# Patient Record
Sex: MALE | Race: WHITE | NOT HISPANIC OR LATINO | Employment: FULL TIME | URBAN - METROPOLITAN AREA
[De-identification: names, ages, dates, MRNs, and addresses within clinical notes are randomized per-mention and may not be internally consistent; named-entity substitution may affect disease eponyms.]

---

## 2018-10-01 ENCOUNTER — PATIENT MESSAGE (OUTPATIENT)
Dept: INTERNAL MEDICINE | Facility: CLINIC | Age: 33
End: 2018-10-01

## 2018-10-01 ENCOUNTER — OFFICE VISIT (OUTPATIENT)
Dept: INTERNAL MEDICINE | Facility: CLINIC | Age: 33
End: 2018-10-01
Attending: FAMILY MEDICINE
Payer: COMMERCIAL

## 2018-10-01 ENCOUNTER — LAB VISIT (OUTPATIENT)
Dept: LAB | Facility: OTHER | Age: 33
End: 2018-10-01
Attending: FAMILY MEDICINE
Payer: COMMERCIAL

## 2018-10-01 VITALS
BODY MASS INDEX: 25.39 KG/M2 | SYSTOLIC BLOOD PRESSURE: 122 MMHG | HEART RATE: 64 BPM | HEIGHT: 63 IN | DIASTOLIC BLOOD PRESSURE: 72 MMHG | WEIGHT: 143.31 LBS | OXYGEN SATURATION: 98 %

## 2018-10-01 DIAGNOSIS — F41.9 ANXIETY: ICD-10-CM

## 2018-10-01 DIAGNOSIS — R79.89 ELEVATED LFTS: Primary | ICD-10-CM

## 2018-10-01 DIAGNOSIS — J30.2 SEASONAL ALLERGIES: ICD-10-CM

## 2018-10-01 DIAGNOSIS — Z00.00 ANNUAL PHYSICAL EXAM: ICD-10-CM

## 2018-10-01 DIAGNOSIS — Z00.00 ANNUAL PHYSICAL EXAM: Primary | ICD-10-CM

## 2018-10-01 LAB
25(OH)D3+25(OH)D2 SERPL-MCNC: 33 NG/ML
ALBUMIN SERPL BCP-MCNC: 4.6 G/DL
ALP SERPL-CCNC: 62 U/L
ALT SERPL W/O P-5'-P-CCNC: 34 U/L
ANION GAP SERPL CALC-SCNC: 10 MMOL/L
AST SERPL-CCNC: 42 U/L
BASOPHILS # BLD AUTO: 0.02 K/UL
BASOPHILS NFR BLD: 0.4 %
BILIRUB SERPL-MCNC: 0.7 MG/DL
BUN SERPL-MCNC: 15 MG/DL
CALCIUM SERPL-MCNC: 9.5 MG/DL
CHLORIDE SERPL-SCNC: 105 MMOL/L
CHOLEST SERPL-MCNC: 155 MG/DL
CHOLEST/HDLC SERPL: 2.8 {RATIO}
CO2 SERPL-SCNC: 26 MMOL/L
CREAT SERPL-MCNC: 0.9 MG/DL
DIFFERENTIAL METHOD: ABNORMAL
EOSINOPHIL # BLD AUTO: 0.1 K/UL
EOSINOPHIL NFR BLD: 2.5 %
ERYTHROCYTE [DISTWIDTH] IN BLOOD BY AUTOMATED COUNT: 12.3 %
EST. GFR  (AFRICAN AMERICAN): >60 ML/MIN/1.73 M^2
EST. GFR  (NON AFRICAN AMERICAN): >60 ML/MIN/1.73 M^2
GLUCOSE SERPL-MCNC: 83 MG/DL
HCT VFR BLD AUTO: 42.3 %
HDLC SERPL-MCNC: 56 MG/DL
HDLC SERPL: 36.1 %
HGB BLD-MCNC: 14.8 G/DL
LDLC SERPL CALC-MCNC: 81.4 MG/DL
LYMPHOCYTES # BLD AUTO: 2 K/UL
LYMPHOCYTES NFR BLD: 40.3 %
MCH RBC QN AUTO: 30.3 PG
MCHC RBC AUTO-ENTMCNC: 35 G/DL
MCV RBC AUTO: 87 FL
MONOCYTES # BLD AUTO: 0.4 K/UL
MONOCYTES NFR BLD: 8 %
NEUTROPHILS # BLD AUTO: 2.4 K/UL
NEUTROPHILS NFR BLD: 48.6 %
NONHDLC SERPL-MCNC: 99 MG/DL
PLATELET # BLD AUTO: 199 K/UL
PMV BLD AUTO: 9.1 FL
POTASSIUM SERPL-SCNC: 3.9 MMOL/L
PROT SERPL-MCNC: 7.4 G/DL
RBC # BLD AUTO: 4.88 M/UL
SODIUM SERPL-SCNC: 141 MMOL/L
T4 FREE SERPL-MCNC: 0.9 NG/DL
TRIGL SERPL-MCNC: 88 MG/DL
TSH SERPL DL<=0.005 MIU/L-ACNC: 1.33 UIU/ML
VIT B12 SERPL-MCNC: 306 PG/ML
WBC # BLD AUTO: 4.89 K/UL

## 2018-10-01 PROCEDURE — 99999 PR PBB SHADOW E&M-NEW PATIENT-LVL III: CPT | Mod: PBBFAC,,, | Performed by: FAMILY MEDICINE

## 2018-10-01 PROCEDURE — 80061 LIPID PANEL: CPT

## 2018-10-01 PROCEDURE — 84439 ASSAY OF FREE THYROXINE: CPT

## 2018-10-01 PROCEDURE — 36415 COLL VENOUS BLD VENIPUNCTURE: CPT

## 2018-10-01 PROCEDURE — 84443 ASSAY THYROID STIM HORMONE: CPT

## 2018-10-01 PROCEDURE — 80053 COMPREHEN METABOLIC PANEL: CPT

## 2018-10-01 PROCEDURE — 82306 VITAMIN D 25 HYDROXY: CPT

## 2018-10-01 PROCEDURE — 99385 PREV VISIT NEW AGE 18-39: CPT | Mod: S$GLB,,, | Performed by: FAMILY MEDICINE

## 2018-10-01 PROCEDURE — 82607 VITAMIN B-12: CPT

## 2018-10-01 PROCEDURE — 85025 COMPLETE CBC W/AUTO DIFF WBC: CPT

## 2018-10-01 RX ORDER — LANOLIN ALCOHOL/MO/W.PET/CERES
400 CREAM (GRAM) TOPICAL NIGHTLY
Qty: 30 TABLET | Refills: 3 | Status: SHIPPED | OUTPATIENT
Start: 2018-10-01

## 2018-10-01 RX ORDER — PROPRANOLOL HYDROCHLORIDE 20 MG/1
20 TABLET ORAL DAILY PRN
Qty: 30 TABLET | Refills: 1 | Status: SHIPPED | OUTPATIENT
Start: 2018-10-01 | End: 2019-10-01

## 2018-10-01 NOTE — PATIENT INSTRUCTIONS
Call to make an appointment within [facility] for psychiatry/psychology [phone]    Linda Young(psychiatrist) [address] [facility] [address] [facility] [address] Phone: [phone]  Aguilar Campos (psychiatrist) [phone]   [address] [facility]     [facility] [address], [address]  Phone: [phone]  You can email for an appointment at: [email or web address]      Marion Doeger    LCSLESIA (therapist) [phone]   [address] [facility]   Shannon Neumann   LCSW (therapist) [phone]   [address] [facility]   Stephanie Mary LCSLESIA (therapist) [phone]   [address] [facility]     Chidi Brown [phone] (therapist) [address] [facility]    Jonathan Neumann (therapist) [phone]  [address] [facility]    Mark Baker (therapist) [phone] [address] [facility]     [facility] [phone]  [facility] [place] [address]    [facility] [place] [address] [place] [address]  [phone]  [email or web address]      AS needed   Propranolol   Hydroxyzine  Xanax or klonipin     Daily medications   wellbutrin   prozac   zoloft       Propranolol tablets  What is this medicine?  PROPRANOLOL (proe PRAN oh lole) is a beta-blocker. Beta-blockers reduce the workload on the heart and help it to beat more regularly. This medicine is used to treat high blood pressure, to control irregular heart rhythms (arrhythmias) and to relieve chest pain caused by angina. It may also be helpful after a heart attack. This medicine is also used to prevent migraine headaches, relieve uncontrollable shaking (tremors), and help certain problems related to the thyroid gland and adrenal gland.  How should I use this medicine?  Take this medicine by mouth with a glass of water. Follow the directions on the prescription label. Take your doses at regular intervals. Do not take your medicine more often than directed. Do not stop taking except on your the advice of your doctor or health care  professional.  Talk to your pediatrician regarding the use of this medicine in children. Special care may be needed.  What side effects may I notice from receiving this medicine?  Side effects that you should report to your doctor or health care professional as soon as possible:  · allergic reactions like skin rash, itching or hives, swelling of the face, lips, or tongue  · breathing problems  · changes in blood sugar  · cold hands or feet  · difficulty sleeping, nightmares  · dry peeling skin  · hallucinations  · muscle cramps or weakness  · slow heart rate  · swelling of the legs and ankles  · vomiting  Side effects that usually do not require medical attention (report to your doctor or health care professional if they continue or are bothersome):  · change in sex drive or performance  · diarrhea  · dry sore eyes  · hair loss  · nausea  · weak or tired  What may interact with this medicine?  Do not take this medicine with any of the following medications:  · feverfew  · phenothiazines like chlorpromazine, mesoridazine, prochlorperazine, thioridazine  This medicine may also interact with the following medications:  · aluminum hydroxide gel  · antipyrine  · antiviral medicines for HIV or AIDS  · barbiturates like phenobarbital  · certain medicines for blood pressure, heart disease, irregular heart beat  · cimetidine  · ciprofloxacin  · diazepam  · fluconazole  · haloperidol  · isoniazid  · medicines for cholesterol like cholestyramine or colestipol  · medicines for mental depression  · medicines for migraine headache like almotriptan, eletriptan, frovatriptan, naratriptan, rizatriptan, sumatriptan, zolmitriptan  · NSAIDs, medicines for pain and inflammation, like ibuprofen or naproxen  · phenytoin  · rifampin  · teniposide  · theophylline  · thyroid medicines  · tolbutamide  · warfarin  · zileuton  What if I miss a dose?  If you miss a dose, take it as soon as you can. If it is almost time for your next dose, take  only that dose. Do not take double or extra doses.  Where should I keep my medicine?  Keep out of the reach of children.  Store at room temperature between 15 and 30 degrees C (59 and 86 degrees F). Protect from light. Throw away any unused medicine after the expiration date.  What should I tell my health care provider before I take this medicine?  They need to know if you have any of these conditions:  · circulation problems or blood vessel disease  · diabetes  · history of heart attack or heart disease, vasospastic angina  · kidney disease  · liver disease  · lung or breathing disease, like asthma or emphysema  · pheochromocytoma  · slow heart rate  · thyroid disease  · an unusual or allergic reaction to propranolol, other beta-blockers, medicines, foods, dyes, or preservatives  · pregnant or trying to get pregnant  · breast-feeding  What should I watch for while using this medicine?  Visit your doctor or health care professional for regular check ups. Check your blood pressure and pulse rate regularly. Ask your health care professional what your blood pressure and pulse rate should be, and when you should contact them.  You may get drowsy or dizzy. Do not drive, use machinery, or do anything that needs mental alertness until you know how this drug affects you. Do not stand or sit up quickly, especially if you are an older patient. This reduces the risk of dizzy or fainting spells. Alcohol can make you more drowsy and dizzy. Avoid alcoholic drinks.  This medicine can affect blood sugar levels. If you have diabetes, check with your doctor or health care professional before you change your diet or the dose of your diabetic medicine.  Do not treat yourself for coughs, colds, or pain while you are taking this medicine without asking your doctor or health care professional for advice. Some ingredients may increase your blood pressure.  NOTE:This sheet is a summary. It may not cover all possible information. If you have  questions about this medicine, talk to your doctor, pharmacist, or health care provider. Copyright© 2017 [employer]

## 2018-10-01 NOTE — PROGRESS NOTES
"Subjective:      Patient ID: Ferny Torres is a 33 y.o. male.    Chief Complaint: Establish Care    HPI   He is here for annual exam. He does go to [facility] dermatology. He reports his mood is good, interest in hobbies there, no guilt, energy level is ok, he has increase in anxiety prior to meetings and that can occur randomly, no SI or HI.      Review of Systems   Constitutional: Negative for activity change, appetite change, chills, diaphoresis, fatigue, fever and unexpected weight change.   HENT: Negative for congestion, postnasal drip, rhinorrhea, sinus pressure, sore throat and trouble swallowing.    Respiratory: Negative for cough, choking, chest tightness, shortness of breath and wheezing.    Cardiovascular: Negative for chest pain, palpitations and leg swelling.   Gastrointestinal: Negative for abdominal distention, abdominal pain, blood in stool, constipation, diarrhea, nausea and vomiting.   Genitourinary: Negative for difficulty urinating, discharge, frequency and hematuria.   Musculoskeletal: Negative.    Psychiatric/Behavioral: Negative for suicidal ideas.     I personally reviewed Past Medical History, Past Surgical history,  Past Social History and Family History      Objective:   /72 (BP Location: Left arm, Patient Position: Sitting, BP Method: Small (Manual))   Pulse 64   Ht 5' 3" (1.6 m)   Wt 65 kg (143 lb 4.8 oz)   SpO2 98%   BMI 25.38 kg/m²     Physical Exam   Constitutional: He is oriented to person, place, and time. He appears well-developed and well-nourished. No distress.   HENT:   Head: Normocephalic and atraumatic.   Right Ear: Hearing, tympanic membrane, external ear and ear canal normal.   Left Ear: Hearing, tympanic membrane, external ear and ear canal normal.   Nose: Nose normal.   Mouth/Throat: Uvula is midline and oropharynx is clear and moist. No oropharyngeal exudate.   Eyes: Conjunctivae and EOM are normal. Pupils are equal, round, and reactive to light.   Neck: Normal range " of motion. Neck supple.   Cardiovascular: Normal rate, regular rhythm, normal heart sounds and intact distal pulses. Exam reveals no gallop and no friction rub.   No murmur heard.  Pulmonary/Chest: Effort normal and breath sounds normal. No stridor. No respiratory distress. He has no wheezes. He has no rales. He exhibits no tenderness.   Abdominal: Soft. Bowel sounds are normal. He exhibits no distension and no mass. There is no tenderness. There is no rebound and no guarding.   Musculoskeletal: Normal range of motion.   Neurological: He is alert and oriented to person, place, and time. No cranial nerve deficit.   Skin: Skin is warm and dry. He is not diaphoretic.   Vitals reviewed.      1. Annual physical exam    2. Seasonal allergies    3. Anxiety        1. Check labs  2. stable, cont current regimen   3. Trial propranolol, if no improvement consider xanax and if no improvement wellbutrin, prozac or zoloft       Orders Placed This Encounter   Procedures    CBC auto differential    Comprehensive metabolic panel    Lipid panel    TSH    T4, free    Vitamin D    Vitamin B12     Medications Ordered This Encounter   Medications    magnesium oxide (MAG-OX) 400 mg tablet     Sig: Take 1 tablet (400 mg total) by mouth every evening.     Dispense:  30 tablet     Refill:  3    propranolol (INDERAL) 20 MG tablet     Sig: Take 1 tablet (20 mg total) by mouth daily as needed.     Dispense:  30 tablet     Refill:  1

## 2019-10-01 ENCOUNTER — PATIENT MESSAGE (OUTPATIENT)
Dept: INTERNAL MEDICINE | Facility: CLINIC | Age: 34
End: 2019-10-01

## 2019-10-01 ENCOUNTER — OFFICE VISIT (OUTPATIENT)
Dept: PRIMARY CARE CLINIC | Facility: CLINIC | Age: 34
End: 2019-10-01
Attending: FAMILY MEDICINE
Payer: COMMERCIAL

## 2019-10-01 ENCOUNTER — TELEPHONE (OUTPATIENT)
Dept: INTERNAL MEDICINE | Facility: CLINIC | Age: 34
End: 2019-10-01

## 2019-10-01 VITALS
HEART RATE: 53 BPM | HEIGHT: 63 IN | SYSTOLIC BLOOD PRESSURE: 116 MMHG | DIASTOLIC BLOOD PRESSURE: 72 MMHG | BODY MASS INDEX: 25.04 KG/M2 | OXYGEN SATURATION: 100 % | WEIGHT: 141.31 LBS

## 2019-10-01 DIAGNOSIS — F41.8 PERFORMANCE ANXIETY: ICD-10-CM

## 2019-10-01 DIAGNOSIS — Z00.00 ANNUAL PHYSICAL EXAM: Primary | ICD-10-CM

## 2019-10-01 DIAGNOSIS — J30.2 SEASONAL ALLERGIES: ICD-10-CM

## 2019-10-01 DIAGNOSIS — Z91.018 MULTIPLE FOOD ALLERGIES: ICD-10-CM

## 2019-10-01 PROCEDURE — 99999 PR PBB SHADOW E&M-EST. PATIENT-LVL III: ICD-10-PCS | Mod: PBBFAC,,, | Performed by: FAMILY MEDICINE

## 2019-10-01 PROCEDURE — 99395 PREV VISIT EST AGE 18-39: CPT | Mod: 25,S$GLB,, | Performed by: FAMILY MEDICINE

## 2019-10-01 PROCEDURE — 90686 FLU VACCINE (QUAD) GREATER THAN OR EQUAL TO 3YO PRESERVATIVE FREE IM: ICD-10-PCS | Mod: S$GLB,,, | Performed by: FAMILY MEDICINE

## 2019-10-01 PROCEDURE — 99395 PR PREVENTIVE VISIT,EST,18-39: ICD-10-PCS | Mod: 25,S$GLB,, | Performed by: FAMILY MEDICINE

## 2019-10-01 PROCEDURE — 90471 IMMUNIZATION ADMIN: CPT | Mod: S$GLB,,, | Performed by: FAMILY MEDICINE

## 2019-10-01 PROCEDURE — 90471 FLU VACCINE (QUAD) GREATER THAN OR EQUAL TO 3YO PRESERVATIVE FREE IM: ICD-10-PCS | Mod: S$GLB,,, | Performed by: FAMILY MEDICINE

## 2019-10-01 PROCEDURE — 90686 IIV4 VACC NO PRSV 0.5 ML IM: CPT | Mod: S$GLB,,, | Performed by: FAMILY MEDICINE

## 2019-10-01 PROCEDURE — 99999 PR PBB SHADOW E&M-EST. PATIENT-LVL III: CPT | Mod: PBBFAC,,, | Performed by: FAMILY MEDICINE

## 2019-10-01 RX ORDER — CLONAZEPAM 0.5 MG/1
0.5 TABLET ORAL DAILY PRN
Qty: 30 TABLET | Refills: 0 | Status: SHIPPED | OUTPATIENT
Start: 2019-10-01 | End: 2020-09-30

## 2019-10-01 NOTE — PROGRESS NOTES
"Subjective:      Patient ID: Ferny Torres is a 34 y.o. male.    Chief Complaint: Annual Exam    HPI   Patient here today for annual exam. Propranolol did not help with presentations.     Review of Systems   Constitutional: Negative for activity change, appetite change, chills, diaphoresis, fatigue, fever and unexpected weight change.   HENT: Negative for congestion, postnasal drip, rhinorrhea, sinus pressure, sore throat and trouble swallowing.    Respiratory: Negative for cough, choking, chest tightness, shortness of breath and wheezing.    Cardiovascular: Negative for chest pain, palpitations and leg swelling.   Gastrointestinal: Negative for abdominal distention, abdominal pain, blood in stool, constipation, diarrhea, nausea and vomiting.   Genitourinary: Negative for difficulty urinating, discharge, frequency and hematuria.   Musculoskeletal: Negative.    Psychiatric/Behavioral: Negative for suicidal ideas.     I personally reviewed Past Medical History, Past Surgical history,  Past Social History and Family History      Objective:   /72 (BP Location: Left arm, Patient Position: Sitting)   Pulse (!) 53   Ht 5' 3" (1.6 m)   Wt 64.1 kg (141 lb 5 oz)   SpO2 100%   BMI 25.03 kg/m²     Physical Exam   Constitutional: He is oriented to person, place, and time. He appears well-developed and well-nourished. No distress.   HENT:   Head: Normocephalic and atraumatic.   Right Ear: Hearing, tympanic membrane, external ear and ear canal normal.   Left Ear: Hearing, tympanic membrane, external ear and ear canal normal.   Nose: Nose normal.   Mouth/Throat: Uvula is midline and oropharynx is clear and moist. No oropharyngeal exudate.   Eyes: Pupils are equal, round, and reactive to light. Conjunctivae and EOM are normal.   Neck: Normal range of motion. Neck supple.   Cardiovascular: Normal rate, regular rhythm, normal heart sounds and intact distal pulses. Exam reveals no gallop and no friction rub.   No murmur " heard.  Pulmonary/Chest: Effort normal and breath sounds normal. No stridor. No respiratory distress. He has no wheezes. He has no rales. He exhibits no tenderness.   Abdominal: Soft. Bowel sounds are normal. He exhibits no distension and no mass. There is no tenderness. There is no rebound and no guarding.   Musculoskeletal: Normal range of motion.   Neurological: He is alert and oriented to person, place, and time. No cranial nerve deficit.   Skin: Skin is warm and dry. He is not diaphoretic.   Vitals reviewed.      1. Annual physical exam    2. Multiple food allergies    3. Seasonal allergies    4. Performance anxiety        1. Dr. Pedro for skin checks, labs   2/3. Allergy evaluation   4. Trial of klonipin if too sedating try xanax he will call to notify    Orders Placed This Encounter   Procedures    Influenza - Quadrivalent (PF)    Comprehensive metabolic panel    CBC auto differential    Lipid panel    Ambulatory consult to Allergy     Medications Ordered This Encounter   Medications    clonazePAM (KLONOPIN) 0.5 MG tablet     Sig: Take 1 tablet (0.5 mg total) by mouth daily as needed for Anxiety.     Dispense:  30 tablet     Refill:  0

## 2019-10-01 NOTE — PROGRESS NOTES
"Patient was given vaccine information sheet for the Flu Vaccine. The area of injection was palpated using the acromion process as a landmark. This area was cleaned with alcohol. Using a 25g 1" safety needle, 0.5mL of the vaccine was placed into the left muscle. The injection site was dressed with a bandage. Patient experienced no complications and was discharged in stable condition. Fluarix vaccine Lot: [id number] Exp: 06/30/2020.  Eveline Gonsalez LPN      "

## 2019-10-22 ENCOUNTER — OFFICE VISIT (OUTPATIENT)
Dept: ALLERGY | Facility: CLINIC | Age: 34
End: 2019-10-22
Payer: COMMERCIAL

## 2019-10-22 ENCOUNTER — LAB VISIT (OUTPATIENT)
Dept: LAB | Facility: HOSPITAL | Age: 34
End: 2019-10-22
Attending: FAMILY MEDICINE
Payer: COMMERCIAL

## 2019-10-22 VITALS — HEIGHT: 63 IN | WEIGHT: 142 LBS | BODY MASS INDEX: 25.16 KG/M2

## 2019-10-22 DIAGNOSIS — R19.7 DIARRHEA, UNSPECIFIED TYPE: ICD-10-CM

## 2019-10-22 DIAGNOSIS — J31.0 CHRONIC RHINITIS: Primary | ICD-10-CM

## 2019-10-22 DIAGNOSIS — Z00.00 ANNUAL PHYSICAL EXAM: ICD-10-CM

## 2019-10-22 LAB
ALBUMIN SERPL BCP-MCNC: 4.5 G/DL (ref 3.5–5.2)
ALP SERPL-CCNC: 58 U/L (ref 55–135)
ALT SERPL W/O P-5'-P-CCNC: 15 U/L (ref 10–44)
ANION GAP SERPL CALC-SCNC: 10 MMOL/L (ref 8–16)
AST SERPL-CCNC: 21 U/L (ref 10–40)
BASOPHILS # BLD AUTO: 0.03 K/UL (ref 0–0.2)
BASOPHILS NFR BLD: 0.7 % (ref 0–1.9)
BILIRUB SERPL-MCNC: 0.7 MG/DL (ref 0.1–1)
BUN SERPL-MCNC: 14 MG/DL (ref 6–20)
CALCIUM SERPL-MCNC: 9.6 MG/DL (ref 8.7–10.5)
CHLORIDE SERPL-SCNC: 104 MMOL/L (ref 95–110)
CHOLEST SERPL-MCNC: 131 MG/DL (ref 120–199)
CHOLEST/HDLC SERPL: 2.3 {RATIO} (ref 2–5)
CO2 SERPL-SCNC: 24 MMOL/L (ref 23–29)
CREAT SERPL-MCNC: 1.1 MG/DL (ref 0.5–1.4)
DIFFERENTIAL METHOD: NORMAL
EOSINOPHIL # BLD AUTO: 0.1 K/UL (ref 0–0.5)
EOSINOPHIL NFR BLD: 1.5 % (ref 0–8)
ERYTHROCYTE [DISTWIDTH] IN BLOOD BY AUTOMATED COUNT: 12.2 % (ref 11.5–14.5)
EST. GFR  (AFRICAN AMERICAN): >60 ML/MIN/1.73 M^2
EST. GFR  (NON AFRICAN AMERICAN): >60 ML/MIN/1.73 M^2
GLUCOSE SERPL-MCNC: 82 MG/DL (ref 70–110)
HCT VFR BLD AUTO: 40.9 % (ref 40–54)
HDLC SERPL-MCNC: 56 MG/DL (ref 40–75)
HDLC SERPL: 42.7 % (ref 20–50)
HGB BLD-MCNC: 14.4 G/DL (ref 14–18)
IMM GRANULOCYTES # BLD AUTO: 0 K/UL (ref 0–0.04)
IMM GRANULOCYTES NFR BLD AUTO: 0 % (ref 0–0.5)
LDLC SERPL CALC-MCNC: 66.8 MG/DL (ref 63–159)
LYMPHOCYTES # BLD AUTO: 1.7 K/UL (ref 1–4.8)
LYMPHOCYTES NFR BLD: 37.1 % (ref 18–48)
MCH RBC QN AUTO: 30.3 PG (ref 27–31)
MCHC RBC AUTO-ENTMCNC: 35.2 G/DL (ref 32–36)
MCV RBC AUTO: 86 FL (ref 82–98)
MONOCYTES # BLD AUTO: 0.4 K/UL (ref 0.3–1)
MONOCYTES NFR BLD: 8.3 % (ref 4–15)
NEUTROPHILS # BLD AUTO: 2.4 K/UL (ref 1.8–7.7)
NEUTROPHILS NFR BLD: 52.4 % (ref 38–73)
NONHDLC SERPL-MCNC: 75 MG/DL
NRBC BLD-RTO: 0 /100 WBC
PLATELET # BLD AUTO: 215 K/UL (ref 150–350)
PMV BLD AUTO: 9.8 FL (ref 9.2–12.9)
POTASSIUM SERPL-SCNC: 3.7 MMOL/L (ref 3.5–5.1)
PROT SERPL-MCNC: 7.2 G/DL (ref 6–8.4)
RBC # BLD AUTO: 4.76 M/UL (ref 4.6–6.2)
SODIUM SERPL-SCNC: 138 MMOL/L (ref 136–145)
TRIGL SERPL-MCNC: 41 MG/DL (ref 30–150)
WBC # BLD AUTO: 4.58 K/UL (ref 3.9–12.7)

## 2019-10-22 PROCEDURE — 36415 COLL VENOUS BLD VENIPUNCTURE: CPT | Mod: PO

## 2019-10-22 PROCEDURE — 80053 COMPREHEN METABOLIC PANEL: CPT

## 2019-10-22 PROCEDURE — 99999 PR PBB SHADOW E&M-EST. PATIENT-LVL III: ICD-10-PCS | Mod: PBBFAC,,, | Performed by: ALLERGY & IMMUNOLOGY

## 2019-10-22 PROCEDURE — 99244 OFF/OP CNSLTJ NEW/EST MOD 40: CPT | Mod: 25,S$GLB,, | Performed by: ALLERGY & IMMUNOLOGY

## 2019-10-22 PROCEDURE — 99244 PR OFFICE CONSULTATION,LEVEL IV: ICD-10-PCS | Mod: 25,S$GLB,, | Performed by: ALLERGY & IMMUNOLOGY

## 2019-10-22 PROCEDURE — 99999 PR PBB SHADOW E&M-EST. PATIENT-LVL III: CPT | Mod: PBBFAC,,, | Performed by: ALLERGY & IMMUNOLOGY

## 2019-10-22 PROCEDURE — 80061 LIPID PANEL: CPT

## 2019-10-22 PROCEDURE — 85025 COMPLETE CBC W/AUTO DIFF WBC: CPT

## 2019-10-22 PROCEDURE — 95004 PERQ TESTS W/ALRGNC XTRCS: CPT | Mod: S$GLB,,, | Performed by: ALLERGY & IMMUNOLOGY

## 2019-10-22 PROCEDURE — 95004 PR ALLERGY SKIN TESTS,ALLERGENS: ICD-10-PCS | Mod: S$GLB,,, | Performed by: ALLERGY & IMMUNOLOGY

## 2019-10-22 RX ORDER — AZELASTINE 1 MG/ML
2 SPRAY, METERED NASAL 2 TIMES DAILY PRN
Qty: 30 ML | Refills: 12 | Status: SHIPPED | OUTPATIENT
Start: 2019-10-22

## 2019-10-22 NOTE — PROGRESS NOTES
Subjective:       Patient ID: Ferny Torres is a 34 y.o. male.    Chief Complaint:  Allergy Testing      33 yo man presents for consult from Dr Lia Aranda for possible allergies. He states for few years he has dry eyes or itchy watery eyes, puffy face, sneeze, runny nose and occ SOB. Has this off and on during the year, no season worse. Worse in AM. No difference inside or out. No triggers. Has taken Claritin, zyrtec or allegra prn and helped a little. No asthma or eczema. He did have deviated septum and had repair this year and seems to have helped. He also worries about food allergy. noticed after sushi he gets diarrhea dn cramping about 20 minutes later. Happens most every time regardless of restaurant. Other times after eating feels fatigues. No hives or swelling. No other medical issues.       Environmental History: see history section for home environment  Review of Systems   Constitutional: Negative for activity change, appetite change, chills, fatigue, fever and unexpected weight change.   HENT: Positive for congestion, rhinorrhea and sneezing. Negative for ear discharge, ear pain, facial swelling, hearing loss, mouth sores, nosebleeds, postnasal drip, sinus pressure, sore throat, tinnitus, trouble swallowing and voice change.    Eyes: Positive for discharge and itching. Negative for redness and visual disturbance.   Respiratory: Negative for cough, chest tightness, shortness of breath and wheezing.    Cardiovascular: Negative for chest pain, palpitations and leg swelling.   Gastrointestinal: Positive for abdominal pain and diarrhea. Negative for abdominal distention, constipation, nausea and vomiting.   Genitourinary: Negative for difficulty urinating.   Musculoskeletal: Negative for arthralgias, back pain, joint swelling and myalgias.   Skin: Negative for color change, pallor and rash.   Neurological: Negative for dizziness, tremors, speech difficulty, weakness, light-headedness and headaches.    Hematological: Negative for adenopathy. Does not bruise/bleed easily.   Psychiatric/Behavioral: Negative for agitation, confusion, decreased concentration and sleep disturbance. The patient is not nervous/anxious.         Objective:      Physical Exam   Constitutional: He is oriented to person, place, and time. He appears well-developed and well-nourished. No distress.   HENT:   Head: Normocephalic and atraumatic.   Right Ear: Hearing, tympanic membrane, external ear and ear canal normal.   Left Ear: Hearing, tympanic membrane, external ear and ear canal normal.   Nose: No mucosal edema (pink turbinates), rhinorrhea, sinus tenderness or septal deviation. No epistaxis.   Mouth/Throat: Oropharynx is clear and moist and mucous membranes are normal. No uvula swelling.   Eyes: Conjunctivae are normal. Right eye exhibits no discharge. Left eye exhibits no discharge.   Neck: Normal range of motion. No thyromegaly present.   Cardiovascular: Normal rate, regular rhythm and normal heart sounds.   No murmur heard.  Pulmonary/Chest: Effort normal and breath sounds normal. No respiratory distress. He has no wheezes.   Abdominal: Soft. He exhibits no distension. There is no tenderness.   Musculoskeletal: Normal range of motion. He exhibits no edema.   Lymphadenopathy:     He has no cervical adenopathy.   Neurological: He is alert and oriented to person, place, and time. Coordination normal.   Skin: Skin is warm and dry. No rash noted. No erythema.   Psychiatric: He has a normal mood and affect. His behavior is normal. Judgment and thought content normal.   Nursing note and vitals reviewed.      Laboratory:   Percutaneous Skin Testing: prick skin test inhalants and select foods, #75, 10/22/19: 1+ [place] and [place] grasses, 3+ histamine control, remainder tested negative, see flow sheet  Assessment:       1. Chronic rhinitis    2. Diarrhea, unspecified type         Plan:       1. Trial azelastine 2 SEN BID as needed or  rhinitis  2. Advised no evidence of IgE mediated food allergy and symptoms more c/w intolerance, advised to keep food journal, try eliminating one item from sushi meal at a time to see what may be triggers  3. Dr Aranda notified of completed consult via [employer]

## 2019-10-22 NOTE — LETTER
October 22, 2019      Lia Aranda MD  [address]  [facility] [address]           [facility] All[facility]  [address] [facility].  [place] [address]  Phone: [phone]          Patient: Ferny Torres   MR Number: [id number]   YOB: 1985   Date of Visit: 10/22/2019       Dear Dr. Lia Aranda:    Thank you for referring Ferny Torres to me for evaluation. Attached you will find relevant portions of my assessment and plan of care.    If you have questions, please do not hesitate to call me. I look forward to following Ferny Torres along with you.    Sincerely,    Breonna Stoddard MD    Enclosure  CC:  No Recipients    If you would like to receive this communication electronically, please contact [email or web address] or [phone] to request more information on [employer] access.    For providers and/or their staff who would like to refer a patient to [facility], please contact us through our one-stop-shop provider referral line, [facility] , at [phone].    If you feel you have received this communication in error or would no longer like to receive these types of communications, please e-mail [email or web address]

## 2020-02-03 ENCOUNTER — OFFICE VISIT (OUTPATIENT)
Dept: ORTHOPEDICS | Facility: CLINIC | Age: 35
End: 2020-02-03
Payer: COMMERCIAL

## 2020-02-03 DIAGNOSIS — M79.645 FINGER PAIN, LEFT: ICD-10-CM

## 2020-02-03 DIAGNOSIS — L03.019 PARONYCHIA OF FINGER, UNSPECIFIED LATERALITY: Primary | ICD-10-CM

## 2020-02-03 PROCEDURE — 99203 PR OFFICE/OUTPT VISIT, NEW, LEVL III, 30-44 MIN: ICD-10-PCS | Mod: S$GLB,,, | Performed by: ORTHOPAEDIC SURGERY

## 2020-02-03 PROCEDURE — 87186 SC STD MICRODIL/AGAR DIL: CPT

## 2020-02-03 PROCEDURE — 99999 PR PBB SHADOW E&M-EST. PATIENT-LVL I: CPT | Mod: PBBFAC,,, | Performed by: ORTHOPAEDIC SURGERY

## 2020-02-03 PROCEDURE — 99999 PR PBB SHADOW E&M-EST. PATIENT-LVL I: ICD-10-PCS | Mod: PBBFAC,,, | Performed by: ORTHOPAEDIC SURGERY

## 2020-02-03 PROCEDURE — 99203 OFFICE O/P NEW LOW 30 MIN: CPT | Mod: S$GLB,,, | Performed by: ORTHOPAEDIC SURGERY

## 2020-02-03 PROCEDURE — 87070 CULTURE OTHR SPECIMN AEROBIC: CPT

## 2020-02-03 PROCEDURE — 87075 CULTR BACTERIA EXCEPT BLOOD: CPT

## 2020-02-03 PROCEDURE — 87077 CULTURE AEROBIC IDENTIFY: CPT

## 2020-02-03 RX ORDER — HYDROCODONE BITARTRATE AND ACETAMINOPHEN 5; 325 MG/1; MG/1
1 TABLET ORAL EVERY 6 HOURS PRN
Qty: 8 TABLET | Refills: 0 | Status: SHIPPED | OUTPATIENT
Start: 2020-02-03

## 2020-02-03 NOTE — PROGRESS NOTES
[facility]  History & Physical  Orthopedics    SUBJECTIVE:      Chief Complaint:  Left ring finger infection    Referring Provider: No ref. provider found     History of Present Illness:  Patient is a 34 y.o. right hand dominant male who presents today with complaints of infection to the left ring finger.  The patient notes that he picks his nails a bit and notice 2 days ago otherwise atraumatic onset of swelling erythema around the left ring finger nail.  He presents today for initial evaluation. He denies any constitutional symptoms as no other complaints today..  He was started on Bactrim at the onset but this has not helped to any significant extent.    The patient is a/an works for a nonprofit organization.    Onset of symptoms/DOI was 2 days ago.    Symptoms are aggravated by activity and movement.    Symptoms are alleviated by rest.    Symptoms consist of pain.    The patient rates their pain as moderate    Attempted treatment(s) and/or interventions include rest, activity modification and antibiotic therapy.     The patient denies any fevers, chills, N/V, D/C and presents for evaluation.       Past Medical History:   Diagnosis Date    Allergy     Concussion 1999    after soccer    Seasonal allergies     Tibial fracture 1999     Past Surgical History:   Procedure Laterality Date    MOLE REMOVAL      WISDOM TOOTH EXTRACTION       Review of patient's allergies indicates:  No Known Allergies  Social History     Social History Narrative    Not on file     Family History   Problem Relation Age of Onset    Skin cancer Mother     Skin cancer Father     Lumbar disc disease Father     Skin cancer Maternal Grandfather     Cancer Maternal Grandfather     Skin cancer Paternal Grandmother     Cancer Paternal Grandmother     Heart attack Paternal Grandfather     Coronary artery disease Paternal Grandfather     Cancer Paternal Grandfather     Hearing loss Paternal Grandfather      Hearing loss Paternal Uncle     Allergic rhinitis Neg Hx     Allergies Neg Hx     Angioedema Neg Hx     Asthma Neg Hx     Atopy Neg Hx     Eczema Neg Hx     Immunodeficiency Neg Hx     Rhinitis Neg Hx     Urticaria Neg Hx          Current Outpatient Medications:     azelastine (ASTELIN) 137 mcg (0.1 %) nasal spray, 2 sprays (274 mcg total) by Nasal route 2 (two) times daily as needed for Rhinitis., Disp: 30 mL, Rfl: 12    clonazePAM (KLONOPIN) 0.5 MG tablet, Take 1 tablet (0.5 mg total) by mouth daily as needed for Anxiety., Disp: 30 tablet, Rfl: 0    HYDROcodone-acetaminophen (NORCO) 5-325 mg per tablet, Take 1 tablet by mouth every 6 (six) hours as needed for Pain., Disp: 8 tablet, Rfl: 0    magnesium oxide (MAG-OX) 400 mg tablet, Take 1 tablet (400 mg total) by mouth every evening. (Patient not taking: Reported on 10/1/2019), Disp: 30 tablet, Rfl: 3    propranolol (INDERAL) 20 MG tablet, Take 1 tablet (20 mg total) by mouth daily as needed. (Patient not taking: Reported on 10/1/2019), Disp: 30 tablet, Rfl: 1      Review of Systems:  Constitutional: no fever or chills  Eyes: no visual changes  ENT: no nasal congestion or sore throat  Respiratory: no cough or shortness of breath  Cardiovascular: no chest pain  Gastrointestinal: no nausea or vomiting, tolerating diet  Musculoskeletal: pain    OBJECTIVE:      Vital Signs (Most Recent):  There were no vitals filed for this visit.  There is no height or weight on file to calculate BMI.      Physical Exam:  Constitutional: The patient appears well-developed and well-nourished. No distress.   Head: Normocephalic and atraumatic.   Nose: Nose normal.   Eyes: Conjunctivae and EOM are normal.   Neck: No tracheal deviation present.   Cardiovascular: Normal rate and intact distal pulses.    Pulmonary/Chest: Effort normal. No respiratory distress.   Abdominal: There is no guarding.   Neurological: The patient is alert.   Psychiatric: The patient has a normal mood  and affect.     Left Hand/Wrist Examination:    Observation/Inspection:  Swelling  expected paronychia changes about the left ring finger with purulent exudate visualized about the nail fold    Deformity  none  Discoloration  none     Scars   none    Atrophy  none    HAND/WRIST EXAMINATION:  Finkelstein's Test   Neg  WHAT Test    Neg  Snuff box tenderness   Neg  Miller's Test    Neg  Hook of Hamate Tenderness  Neg  CMC grind    Neg  Circumduction test   Neg    Neurovascular Exam:  Digits WWP, brisk CR < 3s throughout  NVI motor/LTS to M/R/U nerves, radial pulse 2+  Tinel's Test - Carpal Tunnel  Neg  Tinel's Test - Cubital Tunnel  Neg  Phalen's Test    Neg  Median Nerve Compression Test Neg    ROM hand/wrist/elbow full, painless    Diagnostic Results:     Xray -  mini C-arm x-rays of the left ring finger today demonstrate no evidence of osteomyelitis fracture or dislocation or any significant degenerative changes  EMG - none    ASSESSMENT/PLAN:      34 y.o. yo male with left ring finger paronychia  Plan:  I&D performed in clinic.  Cultures swabs sent.  Recommend continuation of antibiotics.  Discussed wound care as well as Betadine soaks.  Nail fold packed with Xeroform.  Follow-up if needed.      Procedure note:  Under sterile technique a digital block was introduced to left ring finger with 10 cc 1% plain lidocaine.  After anesthetic had taken effect, a digital tourniquet was applied. The wound was prepped with Betadine.  An 11 blade scalp was utilized to incise the nail fold at the site of greatest fluctuance.  Significant purulent material was expressed from the wound from which cultures were obtained. All the pus was drained and a small piece of Xeroform was placed as a packing wick.  The wound was irrigated with copious amounts of sterile saline. The digital tourniquet was then removed and brisk capillary refill in [place] to throughout the left ring finger.  Sterile dressings were applied cystic Xeroform 4 x 4  gauze and Kanchan to the left ring finger.  Blood loss was minimal and the patient tolerated the procedure well with no complications.      Kenny Pritchard M.D.     Please be aware that this note has been generated with the assistance of [email or web address] voice-to-text.  Please excuse any spelling or grammatical errors.

## 2020-02-06 LAB — BACTERIA SPEC AEROBE CULT: ABNORMAL

## 2020-02-10 LAB — BACTERIA SPEC ANAEROBE CULT: NORMAL

## 2020-10-09 ENCOUNTER — OFFICE VISIT (OUTPATIENT)
Dept: ORTHOPEDICS | Facility: CLINIC | Age: 35
End: 2020-10-09
Payer: COMMERCIAL

## 2020-10-09 ENCOUNTER — HOSPITAL ENCOUNTER (OUTPATIENT)
Dept: RADIOLOGY | Facility: HOSPITAL | Age: 35
Discharge: HOME | End: 2020-10-09
Attending: ORTHOPAEDIC SURGERY
Payer: COMMERCIAL

## 2020-10-09 ENCOUNTER — PATIENT MESSAGE (OUTPATIENT)
Dept: PRIMARY CARE CLINIC | Facility: CLINIC | Age: 35
End: 2020-10-09

## 2020-10-09 VITALS — WEIGHT: 142.94 LBS | BODY MASS INDEX: 25.33 KG/M2 | HEIGHT: 63 IN

## 2020-10-09 DIAGNOSIS — M54.50 CHRONIC LOW BACK PAIN WITHOUT SCIATICA, UNSPECIFIED BACK PAIN LATERALITY: Primary | ICD-10-CM

## 2020-10-09 DIAGNOSIS — G89.29 CHRONIC LOW BACK PAIN WITHOUT SCIATICA, UNSPECIFIED BACK PAIN LATERALITY: Primary | ICD-10-CM

## 2020-10-09 DIAGNOSIS — M51.36 DDD (DEGENERATIVE DISC DISEASE), LUMBAR: ICD-10-CM

## 2020-10-09 PROCEDURE — 72120 X-RAY BEND ONLY L-S SPINE: CPT | Mod: 26,,, | Performed by: RADIOLOGY

## 2020-10-09 PROCEDURE — 99999 PR PBB SHADOW E&M-EST. PATIENT-LVL III: ICD-10-PCS | Mod: PBBFAC,,, | Performed by: ORTHOPAEDIC SURGERY

## 2020-10-09 PROCEDURE — 72100 X-RAY EXAM L-S SPINE 2/3 VWS: CPT | Mod: 26,,, | Performed by: RADIOLOGY

## 2020-10-09 PROCEDURE — 99999 PR PBB SHADOW E&M-EST. PATIENT-LVL III: CPT | Mod: PBBFAC,,, | Performed by: ORTHOPAEDIC SURGERY

## 2020-10-09 PROCEDURE — 72120 XR LUMBAR SPINE AP AND LAT WITH FLEX/EXT: ICD-10-PCS | Mod: 26,,, | Performed by: RADIOLOGY

## 2020-10-09 PROCEDURE — 99204 PR OFFICE/OUTPT VISIT, NEW, LEVL IV, 45-59 MIN: ICD-10-PCS | Mod: S$GLB,,, | Performed by: ORTHOPAEDIC SURGERY

## 2020-10-09 PROCEDURE — 72100 X-RAY EXAM L-S SPINE 2/3 VWS: CPT | Mod: TC

## 2020-10-09 PROCEDURE — 3008F BODY MASS INDEX DOCD: CPT | Mod: CPTII,S$GLB,, | Performed by: ORTHOPAEDIC SURGERY

## 2020-10-09 PROCEDURE — 3008F PR BODY MASS INDEX (BMI) DOCUMENTED: ICD-10-PCS | Mod: CPTII,S$GLB,, | Performed by: ORTHOPAEDIC SURGERY

## 2020-10-09 PROCEDURE — 99204 OFFICE O/P NEW MOD 45 MIN: CPT | Mod: S$GLB,,, | Performed by: ORTHOPAEDIC SURGERY

## 2020-10-09 PROCEDURE — 72100 XR LUMBAR SPINE AP AND LAT WITH FLEX/EXT: ICD-10-PCS | Mod: 26,,, | Performed by: RADIOLOGY

## 2020-10-09 RX ORDER — MELOXICAM 15 MG/1
15 TABLET ORAL DAILY
Qty: 30 TABLET | Refills: 1 | Status: SHIPPED | OUTPATIENT
Start: 2020-10-09

## 2020-10-09 NOTE — PROGRESS NOTES
DATE: 10/9/2020  PATIENT: Ferny Torres    Attending Physician: Gee Rodríguez M.D.    CHIEF COMPLAINT: mid to low back pain    HISTORY:  Ferny Torres is a 35 y.o. male here for initial evaluation of low back pain (Back - 2). The pain has been presents for a few months with gradual onset. The patient describes the pain as stiffness and aching.  The pain is worse in the morning and improved by gentle activity. He has not been limited in his activities. There is no associated numbness and tingling. There is no subjective weakness. Prior treatments have included NSAIDs, but no BEAU/PT/surgery.    The Patient denies myelopathic symptoms such as handwriting changes or difficulty with buttons/coins/keys. Denies perineal paresthesias, bowel/bladder dysfunction.    PAST MEDICAL/SURGICAL HISTORY:  Past Medical History:   Diagnosis Date    Allergy     Concussion 1999    after soccer    Seasonal allergies     Tibial fracture 1999     Past Surgical History:   Procedure Laterality Date    MOLE REMOVAL      WISDOM TOOTH EXTRACTION         Current Medications:   Current Outpatient Medications:     azelastine (ASTELIN) 137 mcg (0.1 %) nasal spray, 2 sprays (274 mcg total) by Nasal route 2 (two) times daily as needed for Rhinitis., Disp: 30 mL, Rfl: 12    clonazePAM (KLONOPIN) 0.5 MG tablet, Take 1 tablet (0.5 mg total) by mouth daily as needed for Anxiety., Disp: 30 tablet, Rfl: 0    HYDROcodone-acetaminophen (NORCO) 5-325 mg per tablet, Take 1 tablet by mouth every 6 (six) hours as needed for Pain., Disp: 8 tablet, Rfl: 0    magnesium oxide (MAG-OX) 400 mg tablet, Take 1 tablet (400 mg total) by mouth every evening. (Patient not taking: Reported on 10/1/2019), Disp: 30 tablet, Rfl: 3    meloxicam (MOBIC) 15 MG tablet, Take 1 tablet (15 mg total) by mouth once daily., Disp: 30 tablet, Rfl: 1    propranolol (INDERAL) 20 MG tablet, Take 1 tablet (20 mg total) by mouth daily as needed. (Patient not taking: Reported on  10/1/2019), Disp: 30 tablet, Rfl: 1    Social History:   Social History     Socioeconomic History    Marital status:      Spouse name: Not on file    Number of children: Not on file    Years of education: Not on file    Highest education level: Not on file   Occupational History    Occupation:     Social Needs    Financial resource strain: Not very hard    Food insecurity     Worry: Never true     Inability: Never true    Transportation needs     Medical: No     Non-medical: No   Tobacco Use    Smoking status: Never Smoker    Smokeless tobacco: Never Used   Substance and Sexual Activity    Alcohol use: Yes     Alcohol/week: 3.0 standard drinks     Types: 2 Glasses of wine, 1 Cans of beer per week     Frequency: 2-3 times a week     Drinks per session: 1 or 2     Binge frequency: Less than monthly     Comment: Have a glass of wine with dinner a couple of nights a week    Drug use: No    Sexual activity: Yes     Partners: Female     Birth control/protection: None   Lifestyle    Physical activity     Days per week: 3 days     Minutes per session: 30 min    Stress: Rather much   Relationships    Social connections     Talks on phone: Twice a week     Gets together: Once a week     Attends Shinto service: Not on file     Active member of club or organization: Yes     Attends meetings of clubs or organizations: 1 to 4 times per year     Relationship status:    Other Topics Concern    Not on file   Social History Narrative    Not on file       REVIEW OF SYSTEMS:  Constitution: Negative. Negative for chills, fever and night sweats.   Cardiovascular: Negative for chest pain and syncope.   Respiratory: Negative for cough and shortness of breath.   Gastrointestinal: See HPI. Negative for nausea/vomiting. Negative for abdominal pain.  Genitourinary: See HPI. Negative for discoloration or dysuria.  Skin: Negative for dry skin, itching and rash.   Hematologic/Lymphatic: neg  "for bleeding/clotting disorders.   Musculoskeletal: Negative for falls and muscle weakness.   Neurological: See HPI. no history of seizures. no history of cranial surgery or shunts.  Endocrine: Negative for polydipsia, polyphagia and polyuria.   Allergic/Immunologic: Negative for hives and persistent infections.    PHYSICAL EXAMINATION:    Ht 5' 2.9" (1.598 m)   Wt 64.8 kg (142 lb 15.5 oz)   BMI 25.41 kg/m²     General: The patient is a 35 y.o. male in no apparent distress, the patient is orientatied to person, place and time.   Psych: Normal mood and affect  HEENT: Vision grossly intact, hearing intact to the spoken word.  Lungs: Respirations unlabored.  Gait: Normal station and gait, no difficulty with toe or heel walk.   Skin: Dorsal lumbar skin negative for rashes, lesions, hairy patches and surgical scars.  Range of motion: Lumbar range of motion is acceptable. There is no lumbar tenderness to palpation.  Spinal Balance: Global saggital and coronal spinal balance acceptable, no significant for scoliosis and kyphosis.  Musculoskeletal: No pain with the range of motion of the bilateral hips. No trochanteric tenderness to palpation.  Vascular: Bilateral lower extremities warm and well perfused, Dorsalis pedis pulses 2+ bilaterally.  Neurological: Normal strength and tone in all major motor groups in the bilateral lower extremities. Normal sensation to light touch in the L2-S1 dermatomes bilaterally.  Deep tendon reflexes symmetric in the bilateral lower extremities.  Negative Babinski bilaterally.    IMAGING:   Today I personally reviewed AP, Lat and Flex/Ex upright L-spine films that demonstrate no abnormalities     Body mass index is 25.41 kg/m².  No results found for: HGBA1C      ASSESSMENT/PLAN:    Ferny was seen today for pain.    Diagnoses and all orders for this visit:    Chronic low back pain without sciatica, unspecified back pain laterality  -     Ambulatory referral/consult to Physical/Occupational " Therapy; Future    Other orders  -     meloxicam (MOBIC) 15 MG tablet; Take 1 tablet (15 mg total) by mouth once daily.      No follow-ups on file.    Ferny Torres is a 35 y.o. male with generalized mid-low back pain. Will start PT and Mobic and see him back in 4-6 weeks for re-eval

## 2020-10-21 ENCOUNTER — CLINICAL SUPPORT (OUTPATIENT)
Dept: REHABILITATION | Facility: HOSPITAL | Age: 35
End: 2020-10-21
Payer: COMMERCIAL

## 2020-10-21 DIAGNOSIS — M54.50 CHRONIC LOW BACK PAIN WITHOUT SCIATICA, UNSPECIFIED BACK PAIN LATERALITY: ICD-10-CM

## 2020-10-21 DIAGNOSIS — G89.29 CHRONIC LOW BACK PAIN WITHOUT SCIATICA, UNSPECIFIED BACK PAIN LATERALITY: ICD-10-CM

## 2020-10-21 PROCEDURE — 97110 THERAPEUTIC EXERCISES: CPT | Mod: PN

## 2020-10-21 PROCEDURE — 97161 PT EVAL LOW COMPLEX 20 MIN: CPT | Mod: PN

## 2020-10-21 NOTE — PLAN OF CARE
[facility]  Physical Therapy Initial Evaluation    Name: Ferny Torres  Clinic Number: [id number]    Therapy Diagnosis:   Encounter Diagnosis   Name Primary?    Chronic low back pain without sciatica, unspecified back pain laterality      Physician: Gee Rodríguez MD    Physician Orders: PT Eval and Treat  Medical Diagnosis from Referral: M54.5,G89.29 (ICD-10-CM) - Chronic low back pain without sciatica, unspecified back pain laterality  Evaluation Date: 10/21/2020  Authorization Period Expiration: 12/31/2020  Plan of Care Expiration: 12/18/2020  Visit # / Visits authorized: 1/20  FOTO: 1/5  PTA Visit: 0/6    Cap Visit: 113.20  Cap Total: 113.20    Time In: 9:00 AM  Time Out: 9:30 AM  Total Billable Time: 30 minutes (1 LCE)    Precautions: Standard and concussion history    Subjective   Date of onset: 1/2020  History of current condition - Ferny reports: having some lower neck, upper back, thoracic and lower back pain for the past couple of months to a year. Pt unsure of onset and no ANTONI. Tried a chiropractor in the past that eased some of his stiffness, though his stiffness and pain returned back to baseline after stopping for a couple of months. He is on Mobic currently with no current benefits, just started taking it. His pain and stiffness is worse in the morning that slowly improves with mobility mainly pertaining with his low back, upper back pain stays relatively the same. He is very active that helps with his pain and stiffness though at times interferes with activities such as taking breaks from running to stretch his upper back due to pain.  History of iron colten, marathons, and regular workouts with weights. He sits at a computer or drives/fly's for work frequently. Denies any numbness or tingling.   He runs M, W, F for 2.5-5 miles each day, working out 2x a week with moderate weight that is more circuit based (high reps and low weight), and once every 1-2 weeks yoga  stretches. Has done 2 marathons, 6 1/2 marathons, and one half ironman triathalon. He has a father whom received surgery for his lumbar spine in his 30s and a younger brother who is also going through significant back pain that recently received an epidural. Pt denies any current diagnosed conditions or syndromes.     Medical History:   Past Medical History:   Diagnosis Date    Allergy     Concussion 1999    after soccer    Seasonal allergies     Tibial fracture 1999       Surgical History:   Ferny Torres  has a past surgical history that includes Mole removal and [place] tooth extraction.    Medications:   Ferny has a current medication list which includes the following prescription(s): azelastine, clonazepam, hydrocodone-acetaminophen, magnesium oxide, meloxicam, and propranolol.    Allergies:   Review of patient's allergies indicates:  No Known Allergies     Imaging: See imaging in EMR x-ray 9/10/2020  Alignment is normal.  No fracture dislocation bone destruction seen.  No instability seen.  No trauma seen.    Prior Therapy: None  Social History: WNL  Occupation: runs a non-profit, desk work and traveling  Prior Level of Function: low grade pain for months-years  Current Level of Function Limitations: stiffness and tightness at rest especially in the morning  Pts goals: to get rid of this pain and less morning pain    Pain:  Current 2/10, worst 5/10, best 1/10   Location: C-T junction, thoracic spine, low back  Description: Aching, Dull and Tight  Aggravating Factors: see limitations noted above  Easing Factors: rest, mobility    Objective     Gait: WNL  Posture: slight increased thoracic spine, WNL lumbar and cervical curves  Reflexes:    Clonus: negative   Neumann's Test: negative   Babinski Test: not tested  DTR:    Right Left Comment   Patellar (L3-4) 2+ 2+    Achilles (S1) 2+ 2+      Sensation: WNL  Palpation: +TTP at spinous process throughout spine especially at CT junction  Joint mobility: decreased  lumbar spine mobility  Flexibility: WNL    A/PROM and MMT:  * = back pain with testing  NT = Not tested  AROM: (degrees) LUMBAR   Flexion (40-50) 100%   Extension (15-20) 100%   Right side bending (~20) 100%   Left side bending  (~20) 100%   Right rotation (5-10) 100%   Left rotation (5-10) 100%     Hip  Right   Left  Pain/Dysfunction with Movement    AROM PROM MMT AROM PROM MMT    Flexion (L2,120 deg) WFL WFL 4+/5 WFL WFL 4+/5    Extension (20 deg) WFL WFL 5/5 WFL WFL 5/5    Abduction (L5, 45 deg) WFL WFL 5/5 WFL WFL 5/5    Adduction (30 deg) WFL WFL 5/5 WFL WFL 5/5    IR (30 deg) WFL WFL 5/5 WFL WFL 5/5    ER (45 deg) WFL WFL 5/5 WFL WFL 5/5       Knee  Right   Left  Pain/Dysfunction with Movement    AROM PROM MMT AROM PROM MMT    Flexion (S2, 140 deg) WFL WFL 5/5 WFL WFL 5/5    Extension (L3, 0-5 deg) WFL WFL 5/5 WFL WFL 5/5      Ankle  Right   Left  Pain/Dysfunction with Movement    AROM PROM MMT AROM PROM MMT    Dorsiflexion (L4, 20 deg) WFL WFL 5/5 WFL WFL 5/5    Plantarflexion (S1, 50 deg) WFL WFL 5/5 WFL WFL 5/5    Inversion (20-30 deg) WFL WFL NT WFL WFL NT    Eversion (5-10 deg) WFL WFL NT WFL WFL NT    Great toe extension (L5, 70 deg) WFL WFL 5/5 WFL WFL 5/5      Lumbar Tests:  Vertical compression, elbow flexion, lumbar protective mechanism testing: not tested  Slump test = negative B, mild neck pain  Quadrant test = negative B  SLR Test (L4-S3) = negative B  Lumbar distraction = no change  SL Bridge Test (glut med strength) = not tested  Modified slump test in S/L (femoral nerve) = not tested  Ely's test (L2,L3,L4) = negative B, normal quad length  Prone Instability Test = not tested  Sign of the buttock (very poor SLR, PROM hip flex with knee bent, and non-capsular pattern) = negative    Muscle length Tests:  Mario Alberto Test (thigh flat, 80 knee flexion) = not tested  HS Length Test (80 degrees) = WNL  Piriformis length test (flex 90, 20 add, 20 IR) = WNL  Hip adductors (45 degrees) = WNL  Edgar's test (+  if 10 deg below horizontal) = not tested  Leg length, hip level = WNL    SI Cluster Tests: Not tested    CMS Impairment/Limitation/Restriction for FOTO Lumbar Spine Survey    Therapist reviewed FOTO scores for Ferny Torres on 10/21/2020.   FOTO documents entered into EPIC - see Media section.    Limitation Score: 32%  Category: Mobility  Predicted: 24%     TREATMENT   Treatment Time In: 9:22 AM  Treatment Time Out: 9:30 AM  Total Treatment time separate from Evaluation: 8 minutes    Ferny received therapeutic exercises to develop strength, endurance, ROM, flexibility, posture and core stabilization for 10 minutes including:  LTRs: 10x B  Lumbo-thoracic rotations: 10x B    Home Exercises and Patient Education Provided:  Education provided:   - abdominal bracing, lifting and carrying body mechanics, avoid activities that increase concordant pain  - course of therapy, prognosis    Written Home Exercises Provided: verbal instructions.  Exercises were reviewed and Ferny was able to demonstrate them prior to the end of the session.  Ferny demonstrated good  understanding of the education provided.     See EMR under Media for exercises provided not today. Verbal instructions only.    Assessment   Ferny is a 35 y.o. male referred to outpatient Physical Therapy at [facility] with a medical diagnosis of Chronic low back pain without sciatica, unspecified back pain laterality. Pt currently presents with neck greater than thoracic and low back pain, stiffness with all mobility, impaired posture, and increased morning pain/stiffness in the morning and with prolonged seated activities. Pt would benefit from skilled PT consisting of gait training, muscular skeletal stretching/strengthening, manual therapy, neuro muscular re-education, and modalities prn to address limitations and increase functional mobility.    Pt prognosis is Excellent.   Pt will benefit from skilled outpatient Physical Therapy to address the deficits  stated above and in the chart below, provide pt/family education, and to maximize pt's level of independence.     Plan of care discussed with patient: Yes  Pt's spiritual, cultural and educational needs considered and patient is agreeable to the plan of care and goals as stated below:     Anticipated Barriers for therapy: underlying inflammatory conditions    Medical Necessity is demonstrated by the following  History  Co-morbidities and personal factors that may impact the plan of care Co-morbidities:   None    Personal Factors:   no deficits     low   Examination  Body Structures and Functions, activity limitations and participation restrictions that may impact the plan of care Body Regions:   neck  back  lower extremities  upper extremities  trunk    Body Systems:    gross symmetry  ROM  strength  gross coordinated movement  balance  gait  transfers  transitions  motor control    Participation Restrictions:   Prolonged running    Activity limitations:   Learning and applying knowledge  no deficits    General Tasks and Commands  no deficits    Communication  no deficits    Mobility  no deficits    Self care  no deficits    Domestic Life  no deficits    Interactions/Relationships  no deficits    Life Areas  no deficits    Community and Social Life  no deficits         high   Clinical Presentation stable and uncomplicated low   Decision Making/ Complexity Score: low     GOALS:  Long Term Goals: 8 weeks  1. Report decreased low back pain without radiculopathy to </= 1/10 throughout the day to increase tolerance for ADLs and increased QoL.  2. Increase strength to >/= 5/5 MMT grade for core and BLE to increase tolerance for ADL and work activities.  3. Pt will demo good sitting/standing posture and body mechanics for improved spine health and decreased risk of future injury.  4. Pt to tolerate HEP to improve ROM and independence with ADL's.  5. Patient's goal: to get rid of this pain and less morning pain  6. Pt will  report at </= 24% impaired on FOTO lumbar score for low back pain disability to demonstrate decrease in disability and improvement in back pain.  7. Pt will report decreased CT junction pain for improved QoL.    Plan   Plan of care Certification: 10/21/2020 to 12/18/2020.    Outpatient Physical Therapy 2 times weekly for 8 weeks to include the following interventions: Aquatic Therapy, Cervical/Lumbar Traction, Electrical Stimulation, Gait Training, Manual Therapy, Moist Heat/ Ice, Neuromuscular Re-ed, Orthotic Management and Training, Patient Education, Self Care, Therapeutic Activites and Therapeutic Exercise.     Joe Reilly, PT

## 2020-10-28 PROBLEM — G89.29 CHRONIC LOW BACK PAIN WITHOUT SCIATICA: Status: ACTIVE | Noted: 2020-10-28

## 2020-10-28 PROBLEM — M54.50 CHRONIC LOW BACK PAIN WITHOUT SCIATICA: Status: ACTIVE | Noted: 2020-10-28

## 2020-11-06 ENCOUNTER — CLINICAL SUPPORT (OUTPATIENT)
Dept: REHABILITATION | Facility: HOSPITAL | Age: 35
End: 2020-11-06
Payer: COMMERCIAL

## 2020-11-06 DIAGNOSIS — M54.50 CHRONIC LOW BACK PAIN WITHOUT SCIATICA, UNSPECIFIED BACK PAIN LATERALITY: Primary | ICD-10-CM

## 2020-11-06 DIAGNOSIS — G89.29 CHRONIC LOW BACK PAIN WITHOUT SCIATICA, UNSPECIFIED BACK PAIN LATERALITY: Primary | ICD-10-CM

## 2020-11-06 PROCEDURE — 97110 THERAPEUTIC EXERCISES: CPT | Mod: PN

## 2020-11-06 PROCEDURE — 97140 MANUAL THERAPY 1/> REGIONS: CPT | Mod: PN

## 2020-11-06 NOTE — PROGRESS NOTES
"  Physical Therapy Treatment Note     Name: Ferny Torres  Clinic Number: [id number]    Therapy Diagnosis:   Encounter Diagnosis   Name Primary?    Chronic low back pain without sciatica, unspecified back pain laterality Yes     Physician: Gee Rodríguez MD    Visit Date: 11/6/2020    Physician Orders: PT Eval and Treat  Medical Diagnosis from Referral: M54.5,G89.29 (ICD-10-CM) - Chronic low back pain without sciatica, unspecified back pain laterality  Evaluation Date: 10/21/2020    Authorization Period Expiration: 12/31/2020  Plan of Care Expiration: 12/18/2020  Visit # / Visits authorized: 2/20  FOTO: 2/5  PTA Visit: 0/6     Time In: 0900  Time Out: 0945  Total Billable Time: 40 minutes (2 TE, 1 MT)  Precautions: Standard and concussion history    Subjective     Pt reports: that he is experiencing more upper back/lower neck area tightness this morning.  This occurs mostly in the morning upon awakening.   He was not compliant with home exercise program.  Response to previous treatment: eval and HEP  Functional change: none voiced    Pain: 3/10  Location: B supraclavicular and inter-scapular areas     Objective     Ferny received therapeutic exercises to develop strength, endurance, ROM, flexibility, posture and core stabilization for 25 minutes including:  - thoracic extension with roll    Supine; 2x10  - seated thoracic extension with bolster  2x10  - seated cervical retractions    2x10  - seated UT/LS stretch    5x30"/each  - sidelying open books    15/each      Ferny received the following manual therapy techniques: total time: 15 minutes  - cervical traction followed by B UT/LS STM/MFR  - supine upper thoracic thrust manipulation   - prone middle thoracic thrust manipulation      Home Exercises Provided and Patient Education Provided   Education provided:   - HEP continue    Written Home Exercises Provided: Patient instructed to cont prior HEP.  Exercises were reviewed and Ferny was able to demonstrate them " prior to the end of the session.  Ferny demonstrated good  understanding of the education provided.     See EMR under Media for exercises provided initial evaluation.    Assessment   A: Upper thoracic and CT junction pain with inter-scapular and supraclavicular muscle tightness.  Well-developed BUE/back musculature including latissimus dorsi and pectoralis major.      Ferny is progressing well towards his goals.   Pt prognosis is Excellent.     Pt will continue to benefit from skilled outpatient physical therapy to address the deficits listed in the problem list box on initial evaluation, provide pt/family education and to maximize pt's level of independence in the home and community environment.   Pt's spiritual, cultural and educational needs considered and pt agreeable to plan of care and goals.     Anticipated barriers to physical therapy: underlying inflammatory conditions    Goals:   Long Term Goals: 8 weeks  1. Report decreased low back pain without radiculopathy to </= 1/10 throughout the day to increase tolerance for ADLs and increased QoL.  Progressing towards; not met  2. Increase strength to >/= 5/5 MMT grade for core and BLE to increase tolerance for ADL and work activities.  Progressing towards; not met  3. Pt will demo good sitting/standing posture and body mechanics for improved spine health and decreased risk of future injury.  Progressing towards; not met  4. Pt to tolerate HEP to improve ROM and independence with ADL's.  Progressing towards; not met  5. Patient's goal: to get rid of this pain and less morning pain.  Progressing towards; not met  6. Pt will report at </= 24% impaired on FOTO lumbar score for low back pain disability to demonstrate decrease in disability and improvement in back pain.  Progressing towards; not met  7. Pt will report decreased CT junction pain for improved QoL.  Progressing towards; not met    Plan   Continue plan of care.  Monitor response to interventions.  Adjust  intensity accordingly.     Plan of care Certification: 10/21/2020 to 12/18/2020.    Outpatient Physical Therapy 2 times weekly for 8 weeks to include the following interventions: Aquatic Therapy, Cervical/Lumbar Traction, Electrical Stimulation, Gait Training, Manual Therapy, Moist Heat/ Ice, Neuromuscular Re-ed, Orthotic Management and Training, Patient Education, Self Care, Therapeutic Activites and Therapeutic Exercise.        Donnie Drake, PT

## 2021-04-16 ENCOUNTER — PATIENT MESSAGE (OUTPATIENT)
Dept: RESEARCH | Facility: HOSPITAL | Age: 36
End: 2021-04-16

## 2021-07-15 ENCOUNTER — PATIENT MESSAGE (OUTPATIENT)
Dept: INTERNAL MEDICINE | Facility: CLINIC | Age: 36
End: 2021-07-15

## 2021-08-17 ENCOUNTER — TELEPHONE (OUTPATIENT)
Dept: INTERNAL MEDICINE | Facility: CLINIC | Age: 36
End: 2021-08-17

## 2024-12-16 DIAGNOSIS — S86.011A ACHILLES TENDON TEAR, RIGHT, INITIAL ENCOUNTER: Primary | ICD-10-CM

## 2024-12-17 ENCOUNTER — HOSPITAL ENCOUNTER (OUTPATIENT)
Dept: RADIOLOGY | Facility: HOSPITAL | Age: 39
Discharge: HOME | End: 2024-12-17
Attending: ORTHOPAEDIC SURGERY
Payer: COMMERCIAL

## 2024-12-17 ENCOUNTER — OFFICE VISIT (OUTPATIENT)
Dept: SPORTS MEDICINE | Facility: CLINIC | Age: 39
End: 2024-12-17
Payer: COMMERCIAL

## 2024-12-17 ENCOUNTER — TELEPHONE (OUTPATIENT)
Dept: SPORTS MEDICINE | Facility: CLINIC | Age: 39
End: 2024-12-17
Payer: COMMERCIAL

## 2024-12-17 ENCOUNTER — ANESTHESIA EVENT (OUTPATIENT)
Dept: SURGERY | Facility: HOSPITAL | Age: 39
End: 2024-12-17
Payer: COMMERCIAL

## 2024-12-17 VITALS
SYSTOLIC BLOOD PRESSURE: 133 MMHG | WEIGHT: 143.5 LBS | BODY MASS INDEX: 25.43 KG/M2 | HEIGHT: 63 IN | DIASTOLIC BLOOD PRESSURE: 87 MMHG | HEART RATE: 67 BPM

## 2024-12-17 VITALS
BODY MASS INDEX: 25.43 KG/M2 | SYSTOLIC BLOOD PRESSURE: 133 MMHG | WEIGHT: 143.5 LBS | HEART RATE: 67 BPM | HEIGHT: 63 IN | DIASTOLIC BLOOD PRESSURE: 87 MMHG

## 2024-12-17 DIAGNOSIS — M25.571 ACUTE RIGHT ANKLE PAIN: ICD-10-CM

## 2024-12-17 DIAGNOSIS — M25.571 RIGHT ANKLE PAIN, UNSPECIFIED CHRONICITY: ICD-10-CM

## 2024-12-17 DIAGNOSIS — S86.011A ACHILLES TENDON TEAR, RIGHT, INITIAL ENCOUNTER: Primary | ICD-10-CM

## 2024-12-17 DIAGNOSIS — S86.019A ACUTE RUPTURE OF ACHILLES TENDON: Primary | ICD-10-CM

## 2024-12-17 DIAGNOSIS — S86.011A RUPTURE OF RIGHT ACHILLES TENDON, INITIAL ENCOUNTER: Primary | ICD-10-CM

## 2024-12-17 PROCEDURE — 3079F DIAST BP 80-89 MM HG: CPT | Mod: CPTII,S$GLB,, | Performed by: ORTHOPAEDIC SURGERY

## 2024-12-17 PROCEDURE — 99999 PR PBB SHADOW E&M-EST. PATIENT-LVL IV: CPT | Mod: PBBFAC,,, | Performed by: PHYSICIAN ASSISTANT

## 2024-12-17 PROCEDURE — 73610 X-RAY EXAM OF ANKLE: CPT | Mod: 26,RT,, | Performed by: RADIOLOGY

## 2024-12-17 PROCEDURE — 99499 UNLISTED E&M SERVICE: CPT | Mod: S$GLB,,, | Performed by: PHYSICIAN ASSISTANT

## 2024-12-17 PROCEDURE — 73610 X-RAY EXAM OF ANKLE: CPT | Mod: TC,RT

## 2024-12-17 PROCEDURE — 3008F BODY MASS INDEX DOCD: CPT | Mod: CPTII,S$GLB,, | Performed by: ORTHOPAEDIC SURGERY

## 2024-12-17 PROCEDURE — 99999 PR PBB SHADOW E&M-EST. PATIENT-LVL III: CPT | Mod: PBBFAC,,, | Performed by: ORTHOPAEDIC SURGERY

## 2024-12-17 PROCEDURE — 99205 OFFICE O/P NEW HI 60 MIN: CPT | Mod: 25,S$GLB,, | Performed by: ORTHOPAEDIC SURGERY

## 2024-12-17 PROCEDURE — 3075F SYST BP GE 130 - 139MM HG: CPT | Mod: CPTII,S$GLB,, | Performed by: ORTHOPAEDIC SURGERY

## 2024-12-17 RX ORDER — ASPIRIN 81 MG/1
81 TABLET ORAL 2 TIMES DAILY
Qty: 28 TABLET | Refills: 0 | Status: SHIPPED | OUTPATIENT
Start: 2024-12-17 | End: 2025-01-01

## 2024-12-17 RX ORDER — OXYCODONE HYDROCHLORIDE 5 MG/1
5-10 TABLET ORAL
Qty: 28 TABLET | Refills: 0 | Status: SHIPPED | OUTPATIENT
Start: 2024-12-17

## 2024-12-17 RX ORDER — ONDANSETRON 4 MG/1
4 TABLET, ORALLY DISINTEGRATING ORAL EVERY 8 HOURS PRN
Qty: 30 TABLET | Refills: 0 | Status: SHIPPED | OUTPATIENT
Start: 2024-12-17

## 2024-12-17 RX ORDER — SODIUM CHLORIDE 9 MG/ML
INJECTION, SOLUTION INTRAVENOUS CONTINUOUS
Status: CANCELLED | OUTPATIENT
Start: 2024-12-17

## 2024-12-17 NOTE — H&P (VIEW-ONLY)
Ferny Torres  is here for a completion of his perioperative paperwork. he  Is scheduled to undergo right achilles tendon repair on 11/17/2024.  He is a healthy individual and does not need clearance for this procedure.     Risks, indications and benefits of the surgical procedure were discussed with the patient. All questions with regard to surgery, rehab, expected return to functional activities, activities of daily living and recreational endeavors were answered to his satisfaction.    Discussed COVID-19 with the patient, they are aware of our current policies and procedures, were given the option of delaying surgery, and they elect to proceed.    Patient was informed and understands the risks of surgery are greater for patients with a current condition or hx of heart disease, obesity, clotting disorders, recurrent infections, steroid use, current or past smoking, and factors such as sedentary lifestyle and noncompliance with medications, therapy or f/u. The degree of the increased risk is hard to estimate w/ any degree of precision.    Once no other questions were asked, a brief history and physical exam was then performed.    PAST MEDICAL HISTORY:   Past Medical History:   Diagnosis Date    Allergy     Concussion 1999    after soccer    Seasonal allergies     Tibial fracture 1999     PAST SURGICAL HISTORY:   Past Surgical History:   Procedure Laterality Date    MOLE REMOVAL      WISDOM TOOTH EXTRACTION       FAMILY HISTORY:   Family History   Problem Relation Name Age of Onset    Skin cancer Mother      Skin cancer Father      Lumbar disc disease Father      Skin cancer Maternal Grandfather Jose Mc     Cancer Maternal Grandfather Jose Mc     Skin cancer Paternal Grandmother Dina Dupré     Cancer Paternal Grandmother Dina Dupré     Heart attack Paternal Grandfather Ferny Cooper, .     Coronary artery disease Paternal Grandfather Ferny Cooper, Sr.     Cancer Paternal Grandfather Ferny Cooper Sr.      Hearing loss Paternal Grandfather Ferny Cooper, Sr.     Hearing loss Paternal Uncle Peter Cooper     Allergic rhinitis Neg Hx      Allergies Neg Hx      Angioedema Neg Hx      Asthma Neg Hx      Atopy Neg Hx      Eczema Neg Hx      Immunodeficiency Neg Hx      Rhinitis Neg Hx      Urticaria Neg Hx       SOCIAL HISTORY:   Social History     Socioeconomic History    Marital status:    Occupational History    Occupation:     Tobacco Use    Smoking status: Never    Smokeless tobacco: Never   Substance and Sexual Activity    Alcohol use: Yes     Alcohol/week: 3.0 standard drinks of alcohol     Types: 2 Glasses of wine, 1 Cans of beer per week     Comment: Have a glass of wine with dinner a couple of nights a week    Drug use: No    Sexual activity: Yes     Partners: Female     Birth control/protection: None     Social Drivers of Health     Financial Resource Strain: Low Risk  (9/30/2019)    Overall Financial Resource Strain (CARDIA)     Difficulty of Paying Living Expenses: Not very hard   Food Insecurity: No Food Insecurity (9/30/2019)    Hunger Vital Sign     Worried About Running Out of Food in the Last Year: Never true     Ran Out of Food in the Last Year: Never true   Transportation Needs: No Transportation Needs (9/30/2019)    PRAPARE - Transportation     Lack of Transportation (Medical): No     Lack of Transportation (Non-Medical): No   Physical Activity: Insufficiently Active (9/30/2019)    Exercise Vital Sign     Days of Exercise per Week: 3 days     Minutes of Exercise per Session: 30 min   Stress: Stress Concern Present (9/30/2019)    [employer] - Occupational Stress Questionnaire     Feeling of Stress : Rather much       MEDICATIONS:   Current Outpatient Medications:     azelastine (ASTELIN) 137 mcg (0.1 %) nasal spray, 2 sprays (274 mcg total) by Nasal route 2 (two) times daily as needed for Rhinitis. (Patient not taking: Reported on 12/17/2024), Disp:  30 mL, Rfl: 12    clonazePAM (KLONOPIN) 0.5 MG tablet, Take 1 tablet (0.5 mg total) by mouth daily as needed for Anxiety., Disp: 30 tablet, Rfl: 0    HYDROcodone-acetaminophen (NORCO) 5-325 mg per tablet, Take 1 tablet by mouth every 6 (six) hours as needed for Pain. (Patient not taking: Reported on 12/17/2024), Disp: 8 tablet, Rfl: 0    magnesium oxide (MAG-OX) 400 mg tablet, Take 1 tablet (400 mg total) by mouth every evening. (Patient not taking: Reported on 12/17/2024), Disp: 30 tablet, Rfl: 3    meloxicam (MOBIC) 15 MG tablet, Take 1 tablet (15 mg total) by mouth once daily. (Patient not taking: Reported on 12/17/2024), Disp: 30 tablet, Rfl: 1    propranolol (INDERAL) 20 MG tablet, Take 1 tablet (20 mg total) by mouth daily as needed. (Patient not taking: Reported on 10/1/2019), Disp: 30 tablet, Rfl: 1  ALLERGIES: Review of patient's allergies indicates:  No Known Allergies    Review of Systems   Constitution: Negative. Negative for chills, fever and night sweats.   HENT: Negative for congestion and headaches.    Eyes: Negative for blurred vision, left vision loss and right vision loss.   Cardiovascular: Negative for chest pain and syncope.   Respiratory: Negative for cough and shortness of breath.    Endocrine: Negative for polydipsia, polyphagia and polyuria.   Hematologic/Lymphatic: Negative for bleeding problem. Does not bruise/bleed easily.   Skin: Negative for dry skin, itching and rash.   Musculoskeletal: Negative for falls and muscle weakness.   Gastrointestinal: Negative for abdominal pain and bowel incontinence.   Genitourinary: Negative for bladder incontinence and nocturia.   Neurological: Negative for disturbances in coordination, loss of balance and seizures.   Psychiatric/Behavioral: Negative for depression. The patient does not have insomnia.    Allergic/Immunologic: Negative for hives and persistent infections.     PHYSICAL EXAM:  GEN: A&Ox3, WD WN NAD  HEENT: WNL  CHEST: CTAB, no W/R/R  HEART:  RRR, no M/R/G   ABD: Soft, NT ND, BS x4 QUADS  MS: Refer to previous note for detailed MS exam  NEURO: CN II-XII intact       The surgical consent was then reviewed with the patient, who agreed with all the contents of the consent form and it was signed.     PHYSICAL THERAPY:  He was also instructed regarding physical therapy and will begin on POD#1-3. He is doing physical therapy at [facility].    POST OP CARE: Instructions were reviewed including care of the wound and dressing after surgery and when he can shower.     PAIN MANAGEMENT: Ferny Torres was instructed regarding the Polar ice unit that will be in place after surgery and his postoperative pain medications.     MEDICATION:  Roxicodone 5 mg 1-2 q 4 hours PRN for pain  Zofran 4 mg q 8 hours PRN for nausea and vomiting.  Aspirin 81mg BID x 2 weeks for DVT prophylaxis starting on the evening after surgery.      Post op meds to be delivered bedside prior to discharge. Deliver to family if patient is in surgery at 5pm.     Patient was instructed to purchase and take Colace to counter possible GI side effects of taking opiates.     DVT prophylaxis was discussed with the patient today including risk factors for developing DVTs and history of DVTs. The patient was asked if any specific recommendations were given from the doctor/s that did pre-operative surgical clearance.      If the patient was previously taking 81mg baby aspirin, they were told to not take additional baby aspirin, using the above stated aspirin and to restart the 81mg aspirin daily after completion of the aspirin dose.      Patient was also told to buy over the counter Prilosec medication and take it once daily for GI protection as long as they are taking NSAIDs or Aspirin.     The patient was told that narcotic pain medications may make them drowsy and instructions were given to not sign legal documents, drive or operate heavy machinery, cars, or equipment while  under the influence of narcotic medications.     As there were no other questions to be asked, he was given my business card along with Dr. Harvey's business card if he has any questions or concerns prior to surgery or in the postop period.

## 2024-12-17 NOTE — TELEPHONE ENCOUNTER
Spoke c pt. R/s appt to today. Confirmed appt date, time, location. Pt will call c additional questions/concerns in interim. Pt expressed understanding & was thankful.

## 2024-12-17 NOTE — TELEPHONE ENCOUNTER
Attempted to contact pt. Left voicemail. Called to schedule pt for [facility] appt. Asked pt to return call to clinic at [phone] c additional questions/concerns.

## 2024-12-17 NOTE — H&P
Ferny Torres  is here for a completion of his perioperative paperwork. he  Is scheduled to undergo right achilles tendon repair on 11/17/2024.  He is a healthy individual and does not need clearance for this procedure.     Risks, indications and benefits of the surgical procedure were discussed with the patient. All questions with regard to surgery, rehab, expected return to functional activities, activities of daily living and recreational endeavors were answered to his satisfaction.    Discussed COVID-19 with the patient, they are aware of our current policies and procedures, were given the option of delaying surgery, and they elect to proceed.    Patient was informed and understands the risks of surgery are greater for patients with a current condition or hx of heart disease, obesity, clotting disorders, recurrent infections, steroid use, current or past smoking, and factors such as sedentary lifestyle and noncompliance with medications, therapy or f/u. The degree of the increased risk is hard to estimate w/ any degree of precision.    Once no other questions were asked, a brief history and physical exam was then performed.    PAST MEDICAL HISTORY:   Past Medical History:   Diagnosis Date    Allergy     Concussion 1999    after soccer    Seasonal allergies     Tibial fracture 1999     PAST SURGICAL HISTORY:   Past Surgical History:   Procedure Laterality Date    MOLE REMOVAL      WISDOM TOOTH EXTRACTION       FAMILY HISTORY:   Family History   Problem Relation Name Age of Onset    Skin cancer Mother      Skin cancer Father      Lumbar disc disease Father      Skin cancer Maternal Grandfather Jose Mc     Cancer Maternal Grandfather Jose Mc     Skin cancer Paternal Grandmother Dina Dupré     Cancer Paternal Grandmother Dina Dupré     Heart attack Paternal Grandfather Ferny Cooper, .     Coronary artery disease Paternal Grandfather Ferny Cooper, Sr.     Cancer Paternal Grandfather Ferny Cooper Sr.      Hearing loss Paternal Grandfather Ferny Cooper, Sr.     Hearing loss Paternal Uncle Peter Cooper     Allergic rhinitis Neg Hx      Allergies Neg Hx      Angioedema Neg Hx      Asthma Neg Hx      Atopy Neg Hx      Eczema Neg Hx      Immunodeficiency Neg Hx      Rhinitis Neg Hx      Urticaria Neg Hx       SOCIAL HISTORY:   Social History     Socioeconomic History    Marital status:    Occupational History    Occupation:     Tobacco Use    Smoking status: Never    Smokeless tobacco: Never   Substance and Sexual Activity    Alcohol use: Yes     Alcohol/week: 3.0 standard drinks of alcohol     Types: 2 Glasses of wine, 1 Cans of beer per week     Comment: Have a glass of wine with dinner a couple of nights a week    Drug use: No    Sexual activity: Yes     Partners: Female     Birth control/protection: None     Social Drivers of Health     Financial Resource Strain: Low Risk  (9/30/2019)    Overall Financial Resource Strain (CARDIA)     Difficulty of Paying Living Expenses: Not very hard   Food Insecurity: No Food Insecurity (9/30/2019)    Hunger Vital Sign     Worried About Running Out of Food in the Last Year: Never true     Ran Out of Food in the Last Year: Never true   Transportation Needs: No Transportation Needs (9/30/2019)    PRAPARE - Transportation     Lack of Transportation (Medical): No     Lack of Transportation (Non-Medical): No   Physical Activity: Insufficiently Active (9/30/2019)    Exercise Vital Sign     Days of Exercise per Week: 3 days     Minutes of Exercise per Session: 30 min   Stress: Stress Concern Present (9/30/2019)    [employer] - Occupational Stress Questionnaire     Feeling of Stress : Rather much       MEDICATIONS:   Current Outpatient Medications:     azelastine (ASTELIN) 137 mcg (0.1 %) nasal spray, 2 sprays (274 mcg total) by Nasal route 2 (two) times daily as needed for Rhinitis. (Patient not taking: Reported on 12/17/2024), Disp:  30 mL, Rfl: 12    clonazePAM (KLONOPIN) 0.5 MG tablet, Take 1 tablet (0.5 mg total) by mouth daily as needed for Anxiety., Disp: 30 tablet, Rfl: 0    HYDROcodone-acetaminophen (NORCO) 5-325 mg per tablet, Take 1 tablet by mouth every 6 (six) hours as needed for Pain. (Patient not taking: Reported on 12/17/2024), Disp: 8 tablet, Rfl: 0    magnesium oxide (MAG-OX) 400 mg tablet, Take 1 tablet (400 mg total) by mouth every evening. (Patient not taking: Reported on 12/17/2024), Disp: 30 tablet, Rfl: 3    meloxicam (MOBIC) 15 MG tablet, Take 1 tablet (15 mg total) by mouth once daily. (Patient not taking: Reported on 12/17/2024), Disp: 30 tablet, Rfl: 1    propranolol (INDERAL) 20 MG tablet, Take 1 tablet (20 mg total) by mouth daily as needed. (Patient not taking: Reported on 10/1/2019), Disp: 30 tablet, Rfl: 1  ALLERGIES: Review of patient's allergies indicates:  No Known Allergies    Review of Systems   Constitution: Negative. Negative for chills, fever and night sweats.   HENT: Negative for congestion and headaches.    Eyes: Negative for blurred vision, left vision loss and right vision loss.   Cardiovascular: Negative for chest pain and syncope.   Respiratory: Negative for cough and shortness of breath.    Endocrine: Negative for polydipsia, polyphagia and polyuria.   Hematologic/Lymphatic: Negative for bleeding problem. Does not bruise/bleed easily.   Skin: Negative for dry skin, itching and rash.   Musculoskeletal: Negative for falls and muscle weakness.   Gastrointestinal: Negative for abdominal pain and bowel incontinence.   Genitourinary: Negative for bladder incontinence and nocturia.   Neurological: Negative for disturbances in coordination, loss of balance and seizures.   Psychiatric/Behavioral: Negative for depression. The patient does not have insomnia.    Allergic/Immunologic: Negative for hives and persistent infections.     PHYSICAL EXAM:  GEN: A&Ox3, WD WN NAD  HEENT: WNL  CHEST: CTAB, no W/R/R  HEART:  RRR, no M/R/G   ABD: Soft, NT ND, BS x4 QUADS  MS: Refer to previous note for detailed MS exam  NEURO: CN II-XII intact       The surgical consent was then reviewed with the patient, who agreed with all the contents of the consent form and it was signed.     PHYSICAL THERAPY:  He was also instructed regarding physical therapy and will begin on POD#1-3. He is doing physical therapy at [facility].    POST OP CARE: Instructions were reviewed including care of the wound and dressing after surgery and when he can shower.     PAIN MANAGEMENT: Ferny Torres was instructed regarding the Polar ice unit that will be in place after surgery and his postoperative pain medications.     MEDICATION:  Roxicodone 5 mg 1-2 q 4 hours PRN for pain  Zofran 4 mg q 8 hours PRN for nausea and vomiting.  Aspirin 81mg BID x 2 weeks for DVT prophylaxis starting on the evening after surgery.      Post op meds to be delivered bedside prior to discharge. Deliver to family if patient is in surgery at 5pm.     Patient was instructed to purchase and take Colace to counter possible GI side effects of taking opiates.     DVT prophylaxis was discussed with the patient today including risk factors for developing DVTs and history of DVTs. The patient was asked if any specific recommendations were given from the doctor/s that did pre-operative surgical clearance.      If the patient was previously taking 81mg baby aspirin, they were told to not take additional baby aspirin, using the above stated aspirin and to restart the 81mg aspirin daily after completion of the aspirin dose.      Patient was also told to buy over the counter Prilosec medication and take it once daily for GI protection as long as they are taking NSAIDs or Aspirin.     The patient was told that narcotic pain medications may make them drowsy and instructions were given to not sign legal documents, drive or operate heavy machinery, cars, or equipment while  under the influence of narcotic medications.     As there were no other questions to be asked, he was given my business card along with Dr. Harvey's business card if he has any questions or concerns prior to surgery or in the postop period.

## 2024-12-17 NOTE — PROGRESS NOTES
CC:  Right Achilles injury    39 y.o. Male who presents as a new patient to me.  Referred to me by Dr. Daniel Moore. Ferny injured his right Achilles yesterday while playing pickleball.  He felt the Achilles give way with acute onset injury and inability to continue to play thereafter.  He comes into clinic today in a short walking boot and crutches.  Accompanied by his wife.  History of prior pain or problems.  No prodromal symptoms.  No numbness or tingling.  Localizes to the Achilles.  Reports difficulty with ambulation.  Unable to go up on his toes.    Negative for tobacco.   Negative for diabetes.     REVIEW OF SYSTEMS:   Constitution: Negative. Negative for chills, fever and night sweats.    Hematologic/Lymphatic: Negative for bleeding problem. Does not bruise/bleed easily.   Skin: Negative for dry skin, itching and rash.   Musculoskeletal: Negative for falls. Positive for right hindfoot pain and subjective weakness.     All other review of symptoms were reviewed and found to be noncontributory.     PAST MEDICAL HISTORY:   Past Medical History:   Diagnosis Date    Allergy     Concussion 1999    after soccer    Seasonal allergies     Tibial fracture 1999     PAST SURGICAL HISTORY:   Past Surgical History:   Procedure Laterality Date    MOLE REMOVAL      WISDOM TOOTH EXTRACTION       FAMILY HISTORY:   Family History   Problem Relation Name Age of Onset    Skin cancer Mother      Skin cancer Father      Lumbar disc disease Father      Skin cancer Maternal Grandfather Jose Mc     Cancer Maternal Grandfather Jose Mc     Skin cancer Paternal Grandmother Dina Dupré     Cancer Paternal Grandmother Dina Dupré     Heart attack Paternal Grandfather Ferny Cooper, Sr.     Coronary artery disease Paternal Grandfather Ferny Cooper, Sr.     Cancer Paternal Grandfather Ferny Cooper, Sr.     Hearing loss Paternal Grandfather Ferny Allison, Sr.     Hearing loss Paternal Uncle Peter Cooper     Allergic rhinitis Neg Hx       Allergies Neg Hx      Angioedema Neg Hx      Asthma Neg Hx      Atopy Neg Hx      Eczema Neg Hx      Immunodeficiency Neg Hx      Rhinitis Neg Hx      Urticaria Neg Hx       SOCIAL HISTORY:   Social History     Socioeconomic History    Marital status:    Occupational History    Occupation:     Tobacco Use    Smoking status: Never    Smokeless tobacco: Never   Substance and Sexual Activity    Alcohol use: Yes     Alcohol/week: 3.0 standard drinks of alcohol     Types: 2 Glasses of wine, 1 Cans of beer per week     Comment: Have a glass of wine with dinner a couple of nights a week    Drug use: No    Sexual activity: Yes     Partners: Female     Birth control/protection: None     Social Drivers of Health     Financial Resource Strain: Low Risk  (9/30/2019)    Overall Financial Resource Strain (CARDIA)     Difficulty of Paying Living Expenses: Not very hard   Food Insecurity: No Food Insecurity (9/30/2019)    Hunger Vital Sign     Worried About Running Out of Food in the Last Year: Never true     Ran Out of Food in the Last Year: Never true   Transportation Needs: No Transportation Needs (9/30/2019)    PRAPARE - Transportation     Lack of Transportation (Medical): No     Lack of Transportation (Non-Medical): No   Physical Activity: Insufficiently Active (9/30/2019)    Exercise Vital Sign     Days of Exercise per Week: 3 days     Minutes of Exercise per Session: 30 min   Stress: Stress Concern Present (9/30/2019)    [employer] [place] [employer] - Occupational Stress Questionnaire     Feeling of Stress : Rather much     MEDICATIONS:     Current Outpatient Medications:     aspirin (ECOTRIN) 81 MG EC tablet, Take 1 tablet (81 mg total) by mouth 2 (two) times a day. for 14 days, Disp: 28 tablet, Rfl: 0    azelastine (ASTELIN) 137 mcg (0.1 %) nasal spray, 2 sprays (274 mcg total) by Nasal route 2 (two) times daily as needed for Rhinitis. (Patient not taking: Reported on 12/17/2024),  "Disp: 30 mL, Rfl: 12    clonazePAM (KLONOPIN) 0.5 MG tablet, Take 1 tablet (0.5 mg total) by mouth daily as needed for Anxiety., Disp: 30 tablet, Rfl: 0    HYDROcodone-acetaminophen (NORCO) 5-325 mg per tablet, Take 1 tablet by mouth every 6 (six) hours as needed for Pain. (Patient not taking: Reported on 12/17/2024), Disp: 8 tablet, Rfl: 0    magnesium oxide (MAG-OX) 400 mg tablet, Take 1 tablet (400 mg total) by mouth every evening. (Patient not taking: Reported on 12/17/2024), Disp: 30 tablet, Rfl: 3    meloxicam (MOBIC) 15 MG tablet, Take 1 tablet (15 mg total) by mouth once daily. (Patient not taking: Reported on 12/17/2024), Disp: 30 tablet, Rfl: 1    ondansetron (ZOFRAN-ODT) 4 MG TbDL, Dissovle 1 tablet (4 mg total) by mouth every 8 (eight) hours as needed (nausea)., Disp: 30 tablet, Rfl: 0    oxyCODONE (ROXICODONE) 5 MG immediate release tablet, Take 1-2 tablets (5-10 mg total) by mouth every 4 to 6 hours as needed for Pain., Disp: 28 tablet, Rfl: 0    propranolol (INDERAL) 20 MG tablet, Take 1 tablet (20 mg total) by mouth daily as needed. (Patient not taking: Reported on 10/1/2019), Disp: 30 tablet, Rfl: 1    ALLERGIES:   Review of patient's allergies indicates:  No Known Allergies     PHYSICAL EXAMINATION:  /87   Pulse 67   Ht 5' 3" (1.6 m)   Wt 65.1 kg (143 lb 8.3 oz)   BMI 25.42 kg/m²   General: Well-developed well-nourished 39 y.o. malein no acute distress   Cardiovascular: Regular rhythm by palpation of distal pulse, normal color and temperature, no concerning varicosities on symptomatic side   Lungs: No labored breathing or wheezing appreciated   Neuro: Alert and oriented ×3   Psychiatric: well oriented to person, place and time, demonstrates normal mood and affect   Skin: No rashes, lesions or ulcers, normal temperature, turgor, and texture on involved extremity    Ortho/SPM Exam  Examination of the right foot/ankle demonstrates a palpable defect over the midsubstance Achilles.  Positive " Gonzalez test.  Loss of resting plantar flexion in the prone, knee flexed positioned.  Most consistent with complete Achilles rupture.  Intact distal insertion at the calcaneus.  No skin issues.  No pain to palpation over the ankle.  Normal foot exam.  Sensation intact to light touch over the entire foot.    IMAGING:  X-rays including AP, lateral, oblique views ordered and images reviewed by me show:    No acute abnormality seen.  Mild degenerative changes of the tibiotalar joint with some anterior tibiotalar impingement morphology.      ASSESSMENT:      ICD-10-CM ICD-9-CM   1. Rupture of right Achilles tendon, initial encounter  S86.011A 845.09   2. Acute right ankle pain  M25.571 719.47     338.19       PLAN:     Findings discussed with the patient and his wife.  He has an Achilles rupture.  Remains active at baseline.  Wishes to return to cutting and pivoting type sports.  Discussed both operative and nonoperative treatment options.  He understands that nonoperative care is certainly reasonable as well but he would like to pursue surgical repair given his desire for return to sports.  I think that is reasonable.  The details of surgery were discussed to include the expected postop rehab and recovery course.  He does wish to proceed.  We did outlined risks of surgery which include but are not limited to continued or recurrent pain, tissue nonhealing or rerupture, neurovascular injury, infection, DVT/PE among others.  I expect her to do well.    Plan is right Achilles repair with possible xenograft/allograft augmentation    Informed Consent:    The details of the surgical procedure were explained, including the location of probable incisions and a description of possible hardware and/or grafts to be used. Alternatives to both operative and non-operative options with associated risks and benefits were discussed. The patient understands the likely convalescence after surgery and, in particular, the expected postop  rehab and recovery course. The outlined risks and potential complications of the proposed procedure include but are not limited to: infection, poor wound healing, scarring, deformity, stiffness, swelling, continued or recurrent pain, instability, hardware or prosthetic failure if implanted, symptomatic hardware requiring removal, dislocation, weakness, neurovascular injury, numbness, chronic regional pain disorder, tissue nonhealing/irreparability/retear, subsequent contralateral limb injury or pathology, chondral injury, arthritis, fracture, blood clot formation, inability to return to previous level of activity, anesthetic or regional block complication up to death, need for additional procedure as indicated intraoperatively, and potential need for further surgery.    The patient was also informed and understands that the risks of surgery are greater for patients with a current condition or history of heart disease, obesity, clotting disorders, recurrent infections, steroid use, current or past smoking, and factors such as sedentary lifestyle and noncompliance with medications, therapy or follow-up. The degree of the increased risk is hard to estimate with any degree of precision. If applicable, smoking cessation was discussed.     All questions were answered. The patient has verbalized understanding of these issues and wishes to proceed with the surgery as discussed.    Procedures

## 2024-12-18 ENCOUNTER — HOSPITAL ENCOUNTER (OUTPATIENT)
Facility: HOSPITAL | Age: 39
Discharge: HOME | End: 2024-12-18
Attending: ORTHOPAEDIC SURGERY | Admitting: ORTHOPAEDIC SURGERY
Payer: COMMERCIAL

## 2024-12-18 ENCOUNTER — ANESTHESIA (OUTPATIENT)
Dept: SURGERY | Facility: HOSPITAL | Age: 39
End: 2024-12-18
Payer: COMMERCIAL

## 2024-12-18 VITALS
TEMPERATURE: 98 F | OXYGEN SATURATION: 98 % | WEIGHT: 143 LBS | HEIGHT: 63 IN | SYSTOLIC BLOOD PRESSURE: 110 MMHG | HEART RATE: 90 BPM | DIASTOLIC BLOOD PRESSURE: 51 MMHG | BODY MASS INDEX: 25.34 KG/M2 | RESPIRATION RATE: 16 BRPM

## 2024-12-18 DIAGNOSIS — S86.019A ACUTE RUPTURE OF ACHILLES TENDON: ICD-10-CM

## 2024-12-18 DIAGNOSIS — S86.011A ACHILLES TENDON TEAR, RIGHT, INITIAL ENCOUNTER: Primary | ICD-10-CM

## 2024-12-18 LAB
BUN SERPL-MCNC: 14 MG/DL (ref 6–30)
CHLORIDE SERPL-SCNC: 106 MMOL/L (ref 95–110)
CREAT SERPL-MCNC: 0.9 MG/DL (ref 0.5–1.4)
GLUCOSE SERPL-MCNC: 120 MG/DL (ref 70–110)
HCT VFR BLD CALC: 42 %PCV (ref 36–54)
POC IONIZED CALCIUM: 1.09 MMOL/L (ref 1.06–1.42)
POC TCO2 (MEASURED): 23 MMOL/L (ref 23–29)
POTASSIUM BLD-SCNC: 3.9 MMOL/L (ref 3.5–5.1)
SAMPLE: ABNORMAL
SODIUM BLD-SCNC: 139 MMOL/L (ref 136–145)

## 2024-12-18 PROCEDURE — 27201423 OPTIME MED/SURG SUP & DEVICES STERILE SUPPLY: Performed by: ORTHOPAEDIC SURGERY

## 2024-12-18 PROCEDURE — 63600175 PHARM REV CODE 636 W HCPCS: Performed by: NURSE ANESTHETIST, CERTIFIED REGISTERED

## 2024-12-18 PROCEDURE — 71000033 HC RECOVERY, INTIAL HOUR: Performed by: ORTHOPAEDIC SURGERY

## 2024-12-18 PROCEDURE — 63600175 PHARM REV CODE 636 W HCPCS: Performed by: ORTHOPAEDIC SURGERY

## 2024-12-18 PROCEDURE — 37000009 HC ANESTHESIA EA ADD 15 MINS: Performed by: ORTHOPAEDIC SURGERY

## 2024-12-18 PROCEDURE — 27650 REPAIR ACHILLES TENDON: CPT | Mod: RT,,, | Performed by: ORTHOPAEDIC SURGERY

## 2024-12-18 PROCEDURE — 25000003 PHARM REV CODE 250: Performed by: PHYSICIAN ASSISTANT

## 2024-12-18 PROCEDURE — 71000015 HC POSTOP RECOV 1ST HR: Performed by: ORTHOPAEDIC SURGERY

## 2024-12-18 PROCEDURE — 37000008 HC ANESTHESIA 1ST 15 MINUTES: Performed by: ORTHOPAEDIC SURGERY

## 2024-12-18 PROCEDURE — 64447 NJX AA&/STRD FEMORAL NRV IMG: CPT | Performed by: ANESTHESIOLOGY

## 2024-12-18 PROCEDURE — 25000003 PHARM REV CODE 250

## 2024-12-18 PROCEDURE — 99900035 HC TECH TIME PER 15 MIN (STAT)

## 2024-12-18 PROCEDURE — 94761 N-INVAS EAR/PLS OXIMETRY MLT: CPT

## 2024-12-18 PROCEDURE — 64445 NJX AA&/STRD SCIATIC NRV IMG: CPT | Performed by: ANESTHESIOLOGY

## 2024-12-18 PROCEDURE — 36000709 HC OR TIME LEV III EA ADD 15 MIN: Performed by: ORTHOPAEDIC SURGERY

## 2024-12-18 PROCEDURE — 63600175 PHARM REV CODE 636 W HCPCS: Mod: JZ,JG | Performed by: ANESTHESIOLOGY

## 2024-12-18 PROCEDURE — 63600175 PHARM REV CODE 636 W HCPCS: Performed by: ANESTHESIOLOGY

## 2024-12-18 PROCEDURE — 25000003 PHARM REV CODE 250: Performed by: NURSE ANESTHETIST, CERTIFIED REGISTERED

## 2024-12-18 PROCEDURE — 36000708 HC OR TIME LEV III 1ST 15 MIN: Performed by: ORTHOPAEDIC SURGERY

## 2024-12-18 RX ORDER — HYDROMORPHONE HYDROCHLORIDE 1 MG/ML
0.2 INJECTION, SOLUTION INTRAMUSCULAR; INTRAVENOUS; SUBCUTANEOUS EVERY 5 MIN PRN
Status: DISCONTINUED | OUTPATIENT
Start: 2024-12-18 | End: 2024-12-18 | Stop reason: HOSPADM

## 2024-12-18 RX ORDER — MORPHINE SULFATE 2 MG/ML
2 INJECTION, SOLUTION INTRAMUSCULAR; INTRAVENOUS EVERY 10 MIN PRN
Status: DISCONTINUED | OUTPATIENT
Start: 2024-12-18 | End: 2024-12-18 | Stop reason: HOSPADM

## 2024-12-18 RX ORDER — HALOPERIDOL 5 MG/ML
0.5 INJECTION INTRAMUSCULAR EVERY 10 MIN PRN
Status: DISCONTINUED | OUTPATIENT
Start: 2024-12-18 | End: 2024-12-18 | Stop reason: HOSPADM

## 2024-12-18 RX ORDER — DEXMEDETOMIDINE HYDROCHLORIDE 100 UG/ML
INJECTION, SOLUTION INTRAVENOUS
Status: DISCONTINUED | OUTPATIENT
Start: 2024-12-18 | End: 2024-12-18

## 2024-12-18 RX ORDER — LIDOCAINE HYDROCHLORIDE 20 MG/ML
INJECTION INTRAVENOUS
Status: DISCONTINUED | OUTPATIENT
Start: 2024-12-18 | End: 2024-12-18

## 2024-12-18 RX ORDER — CELECOXIB 200 MG/1
400 CAPSULE ORAL
Status: COMPLETED | OUTPATIENT
Start: 2024-12-18 | End: 2024-12-18

## 2024-12-18 RX ORDER — ACETAMINOPHEN 325 MG/1
650 TABLET ORAL EVERY 4 HOURS PRN
Status: DISCONTINUED | OUTPATIENT
Start: 2024-12-18 | End: 2024-12-18 | Stop reason: HOSPADM

## 2024-12-18 RX ORDER — PROPOFOL 10 MG/ML
VIAL (ML) INTRAVENOUS
Status: DISCONTINUED | OUTPATIENT
Start: 2024-12-18 | End: 2024-12-18

## 2024-12-18 RX ORDER — HYDROCODONE BITARTRATE AND ACETAMINOPHEN 5; 325 MG/1; MG/1
1 TABLET ORAL EVERY 4 HOURS PRN
Status: DISCONTINUED | OUTPATIENT
Start: 2024-12-18 | End: 2024-12-18 | Stop reason: HOSPADM

## 2024-12-18 RX ORDER — OXYCODONE HYDROCHLORIDE 5 MG/1
5 TABLET ORAL
Status: DISCONTINUED | OUTPATIENT
Start: 2024-12-18 | End: 2024-12-18 | Stop reason: SDUPTHER

## 2024-12-18 RX ORDER — SODIUM CHLORIDE 9 MG/ML
INJECTION, SOLUTION INTRAVENOUS CONTINUOUS
Status: DISCONTINUED | OUTPATIENT
Start: 2024-12-18 | End: 2024-12-18 | Stop reason: HOSPADM

## 2024-12-18 RX ORDER — ONDANSETRON HYDROCHLORIDE 2 MG/ML
4 INJECTION, SOLUTION INTRAVENOUS EVERY 12 HOURS PRN
Status: DISCONTINUED | OUTPATIENT
Start: 2024-12-18 | End: 2024-12-18 | Stop reason: HOSPADM

## 2024-12-18 RX ORDER — IBUPROFEN 800 MG/1
800 TABLET ORAL 3 TIMES DAILY
COMMUNITY

## 2024-12-18 RX ORDER — BUPIVACAINE HYDROCHLORIDE 5 MG/ML
INJECTION, SOLUTION EPIDURAL; INTRACAUDAL
Status: COMPLETED | OUTPATIENT
Start: 2024-12-18 | End: 2024-12-18

## 2024-12-18 RX ORDER — MIDAZOLAM HYDROCHLORIDE 1 MG/ML
.5-4 INJECTION, SOLUTION INTRAMUSCULAR; INTRAVENOUS
Status: DISCONTINUED | OUTPATIENT
Start: 2024-12-18 | End: 2024-12-18 | Stop reason: HOSPADM

## 2024-12-18 RX ORDER — FENTANYL CITRATE 50 UG/ML
25-200 INJECTION, SOLUTION INTRAMUSCULAR; INTRAVENOUS
Status: DISCONTINUED | OUTPATIENT
Start: 2024-12-18 | End: 2024-12-18 | Stop reason: HOSPADM

## 2024-12-18 RX ORDER — KETAMINE HCL IN 0.9 % NACL 50 MG/5 ML
SYRINGE (ML) INTRAVENOUS
Status: DISCONTINUED | OUTPATIENT
Start: 2024-12-18 | End: 2024-12-18

## 2024-12-18 RX ORDER — ONDANSETRON HYDROCHLORIDE 2 MG/ML
INJECTION, SOLUTION INTRAVENOUS
Status: DISCONTINUED | OUTPATIENT
Start: 2024-12-18 | End: 2024-12-18

## 2024-12-18 RX ORDER — CEFAZOLIN SODIUM 1 G/3ML
INJECTION, POWDER, FOR SOLUTION INTRAMUSCULAR; INTRAVENOUS
Status: DISCONTINUED | OUTPATIENT
Start: 2024-12-18 | End: 2024-12-18

## 2024-12-18 RX ORDER — VANCOMYCIN HYDROCHLORIDE 1 G/20ML
INJECTION, POWDER, LYOPHILIZED, FOR SOLUTION INTRAVENOUS
Status: DISCONTINUED | OUTPATIENT
Start: 2024-12-18 | End: 2024-12-18 | Stop reason: HOSPADM

## 2024-12-18 RX ORDER — CEFAZOLIN 2 G/1
2 INJECTION, POWDER, FOR SOLUTION INTRAMUSCULAR; INTRAVENOUS
Status: DISCONTINUED | OUTPATIENT
Start: 2024-12-18 | End: 2024-12-18 | Stop reason: HOSPADM

## 2024-12-18 RX ORDER — PROPOFOL 10 MG/ML
VIAL (ML) INTRAVENOUS CONTINUOUS PRN
Status: DISCONTINUED | OUTPATIENT
Start: 2024-12-18 | End: 2024-12-18

## 2024-12-18 RX ORDER — ROPIVACAINE HYDROCHLORIDE 5 MG/ML
INJECTION, SOLUTION EPIDURAL; INFILTRATION; PERINEURAL
Status: DISCONTINUED | OUTPATIENT
Start: 2024-12-18 | End: 2024-12-18

## 2024-12-18 RX ORDER — ONDANSETRON HYDROCHLORIDE 2 MG/ML
4 INJECTION, SOLUTION INTRAVENOUS DAILY PRN
Status: DISCONTINUED | OUTPATIENT
Start: 2024-12-18 | End: 2024-12-18

## 2024-12-18 RX ORDER — MIDAZOLAM HYDROCHLORIDE 1 MG/ML
INJECTION, SOLUTION INTRAMUSCULAR; INTRAVENOUS
Status: DISCONTINUED | OUTPATIENT
Start: 2024-12-18 | End: 2024-12-18

## 2024-12-18 RX ORDER — FAMOTIDINE 10 MG/ML
INJECTION INTRAVENOUS
Status: DISCONTINUED | OUTPATIENT
Start: 2024-12-18 | End: 2024-12-18

## 2024-12-18 RX ORDER — ACETAMINOPHEN 500 MG
1000 TABLET ORAL
Status: COMPLETED | OUTPATIENT
Start: 2024-12-18 | End: 2024-12-18

## 2024-12-18 RX ORDER — FENTANYL CITRATE 50 UG/ML
25 INJECTION, SOLUTION INTRAMUSCULAR; INTRAVENOUS EVERY 5 MIN PRN
Status: DISCONTINUED | OUTPATIENT
Start: 2024-12-18 | End: 2024-12-18 | Stop reason: HOSPADM

## 2024-12-18 RX ADMIN — MIDAZOLAM 2 MG: 1 INJECTION INTRAMUSCULAR; INTRAVENOUS at 11:12

## 2024-12-18 RX ADMIN — Medication 20 MG: at 01:12

## 2024-12-18 RX ADMIN — Medication 10 MG: at 01:12

## 2024-12-18 RX ADMIN — LIDOCAINE HYDROCHLORIDE 75 MG: 20 INJECTION INTRAVENOUS at 01:12

## 2024-12-18 RX ADMIN — PROPOFOL 30 MG: 10 INJECTION, EMULSION INTRAVENOUS at 01:12

## 2024-12-18 RX ADMIN — SODIUM CHLORIDE, SODIUM GLUCONATE, SODIUM ACETATE, POTASSIUM CHLORIDE, MAGNESIUM CHLORIDE, SODIUM PHOSPHATE, DIBASIC, AND POTASSIUM PHOSPHATE: .53; .5; .37; .037; .03; .012; .00082 INJECTION, SOLUTION INTRAVENOUS at 01:12

## 2024-12-18 RX ADMIN — Medication 10 MG: at 02:12

## 2024-12-18 RX ADMIN — PROPOFOL 150 MCG/KG/MIN: 10 INJECTION, EMULSION INTRAVENOUS at 01:12

## 2024-12-18 RX ADMIN — BUPIVACAINE HYDROCHLORIDE 20 ML: 5 INJECTION, SOLUTION EPIDURAL; INTRACAUDAL at 11:12

## 2024-12-18 RX ADMIN — FENTANYL CITRATE 100 MCG: 50 INJECTION INTRAMUSCULAR; INTRAVENOUS at 11:12

## 2024-12-18 RX ADMIN — CEFAZOLIN 2 G: 330 INJECTION, POWDER, FOR SOLUTION INTRAMUSCULAR; INTRAVENOUS at 01:12

## 2024-12-18 RX ADMIN — CELECOXIB 400 MG: 200 CAPSULE ORAL at 11:12

## 2024-12-18 RX ADMIN — SODIUM CHLORIDE: 0.9 INJECTION, SOLUTION INTRAVENOUS at 12:12

## 2024-12-18 RX ADMIN — ACETAMINOPHEN 650 MG: 325 TABLET ORAL at 04:12

## 2024-12-18 RX ADMIN — DEXMEDETOMIDINE 10 MCG: 100 INJECTION, SOLUTION, CONCENTRATE INTRAVENOUS at 01:12

## 2024-12-18 RX ADMIN — MIDAZOLAM HYDROCHLORIDE 2 MG: 2 INJECTION, SOLUTION INTRAMUSCULAR; INTRAVENOUS at 01:12

## 2024-12-18 RX ADMIN — BUPIVACAINE HYDROCHLORIDE 10 ML: 5 INJECTION, SOLUTION EPIDURAL; INTRACAUDAL; PERINEURAL at 11:12

## 2024-12-18 RX ADMIN — ONDANSETRON 4 MG: 2 INJECTION INTRAMUSCULAR; INTRAVENOUS at 01:12

## 2024-12-18 RX ADMIN — FAMOTIDINE 20 MG: 10 INJECTION, SOLUTION INTRAVENOUS at 01:12

## 2024-12-18 RX ADMIN — ACETAMINOPHEN 1000 MG: 500 TABLET ORAL at 11:12

## 2024-12-18 NOTE — PLAN OF CARE
VSS.  Patient tolerating oral liquids without difficulty.   No apparent s&s of distress noted at this time, no complaints voiced at this time.   Discharge instructions reviewed with patient/family/friend with good verbal feedback received.   Post op medications bedside delivery, DME crutches.  Patient ready for discharge.

## 2024-12-18 NOTE — BRIEF OP NOTE
[place] - Surgery ([facility])  Brief Operative Note    Surgery Date: 12/18/2024     Surgeons and Role:     * LESIA Harvey MD - Primary    Assisting Surgeon: None    Pre-op Diagnosis:  Acute rupture of Achilles tendon [S86.019A]    Post-op Diagnosis:  Post-Op Diagnosis Codes:     * Acute rupture of Achilles tendon [S86.019A]    Procedure(s) (LRB):  REPAIR, RUPTURE, TENDON, ACHILLES (Right)    Anesthesia: General/Regional    Operative Findings: Right achilles tendon repair was performed.  No intra-operative complications were noted.  Skin was in good condition and well-approximated at closure, and patient was placed in a short leg splint.     Estimated Blood Loss: * No values recorded between 12/18/2024  1:50 PM and 12/18/2024  3:17 PM *         Specimens:   Specimen (24h ago, onward)      None              Discharge Note    OUTCOME: Patient tolerated treatment/procedure well without complication and is now ready for discharge.    DISPOSITION: Home or Self Care    FINAL DIAGNOSIS:  Achilles tendon tear, right, initial encounter    FOLLOWUP: In clinic    DISCHARGE INSTRUCTIONS:    Discharge Procedure Orders   Diet general     Call MD for:  temperature >100.4     Call MD for:  persistent nausea and vomiting     Call MD for:  severe uncontrolled pain     Call MD for:  difficulty breathing, headache or visual disturbances     Call MD for:  redness, tenderness, or signs of infection (pain, swelling, redness, odor or green/yellow discharge around incision site)     Call MD for:  hives     Call MD for:  persistent dizziness or light-headedness     Call MD for:  extreme fatigue     Keep surgical extremity elevated     No driving, operating heavy equipment or signing legal documents while taking pain medication     Leave dressing on - Keep it clean, dry, and intact until clinic visit     Non weight bearing          Medication List        CONTINUE taking these medications      aspirin 81 MG EC tablet  Commonly known as:  ECOTRIN  Take 1 tablet (81 mg total) by mouth 2 (two) times a day. for 14 days     clonazePAM 0.5 MG tablet  Commonly known as: KlonoPIN  Take 1 tablet (0.5 mg total) by mouth daily as needed for Anxiety.     ibuprofen 800 MG tablet  Commonly known as: ADVIL,MOTRIN     ondansetron 4 MG Tbdl  Commonly known as: ZOFRAN-ODT  Dissovle 1 tablet (4 mg total) by mouth every 8 (eight) hours as needed (nausea).     oxyCODONE 5 MG immediate release tablet  Commonly known as: ROXICODONE  Take 1-2 tablets (5-10 mg total) by mouth every 4 to 6 hours as needed for Pain.     propranoloL 20 MG tablet  Commonly known as: INDERAL  Take 1 tablet (20 mg total) by mouth daily as needed.            STOP taking these medications      meloxicam 15 MG tablet  Commonly known as: MOBIC            ASK your doctor about these medications      azelastine 137 mcg (0.1 %) nasal spray  Commonly known as: ASTELIN  2 sprays (274 mcg total) by Nasal route 2 (two) times daily as needed for Rhinitis.     HYDROcodone-acetaminophen 5-325 mg per tablet  Commonly known as: NORCO  Take 1 tablet by mouth every 6 (six) hours as needed for Pain.     magnesium oxide 400 mg (241.3 mg magnesium) tablet  Commonly known as: MAG-OX  Take 1 tablet (400 mg total) by mouth every evening.

## 2024-12-18 NOTE — ANESTHESIA PROCEDURE NOTES
Adductor Canal Single Shot Nerve Block    Patient location during procedure: pre-op   Block not for primary anesthetic.  Reason for block: at surgeon's request and post-op pain management   Post-op Pain Location: R Achilles pain   Start time: 12/18/2024 11:45 AM  Timeout: 12/18/2024 11:42 AM   End time: 12/18/2024 11:47 AM    Staffing  Authorizing Provider: Mckay Blandon MD  Performing Provider: Mckay Blandon MD    Staffing  Performed by: Mckay Blandon MD  Authorized by: Mckay Blandon MD    Preanesthetic Checklist  Completed: patient identified, IV checked, site marked, risks and benefits discussed, surgical consent, monitors and equipment checked, pre-op evaluation and timeout performed  Peripheral Block  Patient position: supine  Prep: ChloraPrep  Patient monitoring: heart rate, cardiac monitor, continuous pulse ox, continuous capnometry and frequent blood pressure checks  Block type: adductor canal  Laterality: right  Injection technique: single shot  Needle  Needle type: Stimuplex   Needle gauge: 20 G  Needle length: 4 in  Needle localization: anatomical landmarks and ultrasound guidance   -ultrasound image captured on disc.  Assessment  Injection assessment: negative aspiration, negative parasthesia and local visualized surrounding nerve  Paresthesia pain: none  Heart rate change: no  Slow fractionated injection: yes  Pain Tolerance: comfortable throughout block and no complaints  Medications:    Medications: bupivacaine (pf) (MARCAINE) injection 0.5% - Perineural   10 mL - 12/18/2024 11:47:00 AM    Additional Notes  VSS.  [facility] RN monitoring vitals throughout procedure.  Patient tolerated procedure well.

## 2024-12-18 NOTE — ANESTHESIA POSTPROCEDURE EVALUATION
Anesthesia Post Evaluation    Patient: Freny Melissa    Procedure(s) Performed: Procedure(s) (LRB):  REPAIR, RUPTURE, TENDON, ACHILLES (Right)    Final Anesthesia Type: general      Patient location during evaluation: PACU  Patient participation: Yes- Able to Participate  Level of consciousness: awake and alert  Post-procedure vital signs: reviewed and stable  Pain management: adequate  Airway patency: patent  HAYLEE mitigation strategies: Multimodal analgesia  PONV status at discharge: No PONV  Anesthetic complications: no      Cardiovascular status: blood pressure returned to baseline and hemodynamically stable  Respiratory status: unassisted  Hydration status: euvolemic  Follow-up not needed.              Vitals Value Taken Time   /51 12/18/24 1522   Temp 36.7 °C (98.1 °F) 12/18/24 1521   Pulse 89 12/18/24 1525   Resp 19 12/18/24 1525   SpO2 98 % 12/18/24 1525   Vitals shown include unfiled device data.      No case tracking events are documented in the log.      Pain/Tess Score: Pain Rating Prior to Med Admin: 0 (12/18/2024 11:45 AM)  Pain Rating Post Med Admin: 0 (12/18/2024  1:00 PM)  Tess Score: 5 (12/18/2024  3:22 PM)

## 2024-12-18 NOTE — ANESTHESIA PREPROCEDURE EVALUATION
12/18/2024  Pre-operative evaluation for Procedure(s) (LRB):  REPAIR, RUPTURE, TENDON, ACHILLES (Right)    Ferny Torres is a 39 y.o. male     Patient Active Problem List   Diagnosis    Seasonal allergies    Chronic low back pain without sciatica    Achilles tendon tear, right, initial encounter       Review of patient's allergies indicates:  No Known Allergies    No current facility-administered medications on file prior to encounter.     Current Outpatient Medications on File Prior to Encounter   Medication Sig Dispense Refill    ibuprofen (ADVIL,MOTRIN) 800 MG tablet Take 800 mg by mouth 3 (three) times daily.      aspirin (ECOTRIN) 81 MG EC tablet Take 1 tablet (81 mg total) by mouth 2 (two) times a day. for 14 days 28 tablet 0    azelastine (ASTELIN) 137 mcg (0.1 %) nasal spray 2 sprays (274 mcg total) by Nasal route 2 (two) times daily as needed for Rhinitis. (Patient not taking: Reported on 12/17/2024) 30 mL 12    clonazePAM (KLONOPIN) 0.5 MG tablet Take 1 tablet (0.5 mg total) by mouth daily as needed for Anxiety. 30 tablet 0    HYDROcodone-acetaminophen (NORCO) 5-325 mg per tablet Take 1 tablet by mouth every 6 (six) hours as needed for Pain. (Patient not taking: Reported on 12/17/2024) 8 tablet 0    magnesium oxide (MAG-OX) 400 mg tablet Take 1 tablet (400 mg total) by mouth every evening. (Patient not taking: Reported on 10/1/2019) 30 tablet 3    meloxicam (MOBIC) 15 MG tablet Take 1 tablet (15 mg total) by mouth once daily. (Patient not taking: Reported on 12/17/2024) 30 tablet 1    ondansetron (ZOFRAN-ODT) 4 MG TbDL Dissovle 1 tablet (4 mg total) by mouth every 8 (eight) hours as needed (nausea). 30 tablet 0    oxyCODONE (ROXICODONE) 5 MG immediate release tablet Take 1-2 tablets (5-10 mg total) by mouth every 4 to 6 hours as needed for Pain. 28 tablet 0    propranolol (INDERAL) 20 MG tablet Take  "1 tablet (20 mg total) by mouth daily as needed. (Patient not taking: Reported on 10/1/2019) 30 tablet 1       Past Surgical History:   Procedure Laterality Date    MOLE REMOVAL      WISDOM TOOTH EXTRACTION         Social History     Socioeconomic History    Marital status:    Occupational History    Occupation:     Tobacco Use    Smoking status: Never    Smokeless tobacco: Never   Substance and Sexual Activity    Alcohol use: Yes     Alcohol/week: 3.0 standard drinks of alcohol     Types: 2 Glasses of wine, 1 Cans of beer per week     Comment: Have a glass of wine with dinner a couple of nights a week    Drug use: No    Sexual activity: Yes     Partners: Female     Birth control/protection: None     Social Drivers of Health     Financial Resource Strain: Low Risk  (9/30/2019)    Overall Financial Resource Strain (CARDIA)     Difficulty of Paying Living Expenses: Not very hard   Food Insecurity: No Food Insecurity (9/30/2019)    Hunger Vital Sign     Worried About Running Out of Food in the Last Year: Never true     Ran Out of Food in the Last Year: Never true   Transportation Needs: No Transportation Needs (9/30/2019)    PRAPARE - Transportation     Lack of Transportation (Medical): No     Lack of Transportation (Non-Medical): No   Physical Activity: Insufficiently Active (9/30/2019)    Exercise Vital Sign     Days of Exercise per Week: 3 days     Minutes of Exercise per Session: 30 min   Stress: Stress Concern Present (9/30/2019)    [employer] [place] [employer] - Occupational Stress Questionnaire     Feeling of Stress : Rather much         CBC:   Recent Labs     12/18/24  1120   HCT 42       CMP: No results for input(s): "NA", "K", "CL", "CO2", "BUN", "CREATININE", "GLU", "MG", "PHOS", "CALCIUM", "ALBUMIN", "PROT", "ALKPHOS", "ALT", "AST", "BILITOT" in the last 72 hours.    INR  No results for input(s): "PT", "INR", "PROTIME", "APTT" in the last 72 hours.        Diagnostic " Studies:      EKD Echo:  No results found for this or any previous visit.       Pre-op Assessment    I have reviewed the Patient Summary Reports.     I have reviewed the Nursing Notes. I have reviewed the NPO Status.   I have reviewed the Medications.     Review of Systems      Physical Exam  General: Well nourished and Cooperative    Airway:  Mallampati: II   Mouth Opening: Normal  TM Distance: Normal  Tongue: Normal  Neck ROM: Normal ROM    Chest/Lungs:  Clear to auscultation, Normal Respiratory Rate    Heart:  Rate: Normal  Rhythm: Regular Rhythm  Sounds: Normal        Anesthesia Plan  Type of Anesthesia, risks & benefits discussed:    Anesthesia Type: Gen Natural Airway, Gen ETT  Intra-op Monitoring Plan: Standard ASA Monitors  Post Op Pain Control Plan: multimodal analgesia and IV/PO Opioids PRN  Induction:  IV  Airway Plan: Direct and Video, Post-Induction  Informed Consent: Informed consent signed with the Patient and all parties understand the risks and agree with anesthesia plan.  All questions answered.   ASA Score: 1    Ready For Surgery From Anesthesia Perspective.     .

## 2024-12-18 NOTE — PLAN OF CARE
Pre op complete. Waiting on anesthesia consent, site kris and update. Pt's wife at bedside; she will hold pt belongings. Pt resting comfortably with all questions addressed at this time and call light in reach. Pt has boot.

## 2024-12-18 NOTE — ANESTHESIA PROCEDURE NOTES
Popliteal Single Shot Nerve Block    Patient location during procedure: pre-op   Block not for primary anesthetic.  Reason for block: at surgeon's request and post-op pain management   Post-op Pain Location: R Achilles pain   Start time: 12/18/2024 11:43 AM  Timeout: 12/18/2024 11:42 AM   End time: 12/18/2024 11:45 AM    Staffing  Authorizing Provider: Mckay Blandon MD  Performing Provider: Mckay Blandon MD    Staffing  Performed by: Mckay Blandon MD  Authorized by: Mckay Blandon MD    Preanesthetic Checklist  Completed: patient identified, IV checked, site marked, risks and benefits discussed, surgical consent, monitors and equipment checked, pre-op evaluation and timeout performed  Peripheral Block  Patient position: supine  Prep: ChloraPrep  Patient monitoring: heart rate, cardiac monitor, continuous pulse ox, continuous capnometry and frequent blood pressure checks  Block type: popliteal  Laterality: left  Injection technique: single shot  Needle  Needle type: Stimuplex   Needle gauge: 21 G  Needle length: 4 in  Needle localization: anatomical landmarks and ultrasound guidance   -ultrasound image captured on disc.  Assessment  Injection assessment: negative aspiration, negative parasthesia and local visualized surrounding nerve  Paresthesia pain: none  Heart rate change: no  Slow fractionated injection: yes  Pain Tolerance: comfortable throughout block and no complaints  Medications:    Medications: bupivacaine (pf) (MARCAINE) injection 0.5% - Perineural   20 mL - 12/18/2024 11:42:00 AM    Additional Notes  VSS.  [facility] RN monitoring vitals throughout procedure.  Patient tolerated procedure well.

## 2024-12-19 ENCOUNTER — TELEPHONE (OUTPATIENT)
Dept: SPORTS MEDICINE | Facility: CLINIC | Age: 39
End: 2024-12-19
Payer: COMMERCIAL

## 2024-12-19 DIAGNOSIS — Z98.890 STATUS POST ACHILLES TENDON REPAIR: Primary | ICD-10-CM

## 2024-12-19 NOTE — TELEPHONE ENCOUNTER
Faxed knee scooter order to [employer]. Confirmation received        Attempted to contact pt. Left voicemail. Advised that order has been sent to [employer]. He can reach out to them directly at [phone]. Asked pt to return call to clinic at [phone] c additional questions/concerns.

## 2024-12-23 NOTE — OP NOTE
[facility]   OPERATIVE REPORT   ORTHOPAEDIC SURGERY   PROVIDER: DR. LEON POSADA    PATIENT INFORMATION   Ferny Torres 39 y.o. male 1985   MRN: [id number]   LOCATION: [facility]     DATE OF PROCEDURE: 12/18/2024     PREOPERATIVE DIAGNOSES:   Right Achilles tendon complete rupture     POSTOPERATIVE DIAGNOSES:   Right Achilles tendon complete rupture     PROCEDURES PERFORMED:   Right Achilles open repair (CPT 90085)    SURGEON: LEON Posada MD     ASSISTANTS:  Pedrito Sidhu MD - Resident  SMA Pancho     ANESTHESIA: General with popliteal block    ESTIMATED BLOOD LOSS: Minimal    IMPLANTS: * No implants in log *     FINDINGS:  Near complete midsubstance Achilles rupture.      SPECIMENS: None.    COMPLICATIONS: None.     INTRAOPERATIVE COUNTS: Correct.     PROPHYLACTIC IV ANTIBIOTICS: Given per [facility] Protocol.    INDICATIONS FOR THE PROCEDURE: Ferny is a very nice 39 year old gentleman who recently injured his right Achilles while playing pickleball. He was found to have a significant Achilles rupture clinically.  Both operative and non operative treatment options discussed and he has elected for surgery.  Full informed consent was obtained prior to proceeding.     DETAILS OF THE PROCEDURE: The patient was met in the preoperative area and the operative site was identified and marked.  All final questions were answered.  Regional block was performed per the anesthesia team.  The patient was brought to the operating room and placed supine. General anesthesia was administered.  The patient was flipped into the prone position with the assistance of chest bolsters.  All bony prominences were well-padded.  A nonsterile tourniquet was placed high on the patient's operative thigh.  The right lower extremity was then prepped and draped in the usual sterile fashion for Achilles surgery. A verbal timeout was performed.  The patient received preoperative antibiotic per protocol. The leg was  exsanguinated and the tourniquet was inflated to 250 mmHg.  A scalpel was used to make an approximate 8 cm incision over the medial aspect of the Achilles tendon longitudinally. Full-thickness flaps were preserved.  The sural nerve was protected  along its entire extent. Hematoma and serous fluid were encountered at the site of the full-thickness Achilles rupture over the proximal tendinous zone.  The paratenon layer along with posterior compartment fascia were sharply dissected over the midline and reflected medial and lateral away from the tendon.  The tendon was found to be fairly healthy overall.  This was a complete rupture with associated tissue attenuation. The ends of the torn Achilles were trimmed and debrided sharply to remove nonviable tissue. #2 Arthrex Suturetape suture was used to whipstitch the proximal and distal aspects of the torn tendon in locking [place] fashion. With the foot held in plantarflexion, the sutures were then tied for primary tendon repair. 3-0 Prolene suture was then used for epitendinous repair and augmentation. A bulb syringe was used to thoroughly irrigate the wound with normal saline. 2-0 Vicryl was then used to repair the paratenon layer and fascial layers over the tendon both proximally and distally.  There was complete closure of the paratenon over the repair site and Achilles. A bulb syringe was used again to thoroughly irrigate the wound with normal saline. The tourniquet was released.  There were no significant bleeders. The subcutaneous tissue layer was then closed together using 3-0 Vicryl suture in the deep inverted fashion.  Skin was meticulously closed using 3-0 Nylon in a running Farmer's stitch fashion. Dressings included Xeroform, 4 x 4's, cast padding, and Kanchan wrap. The patient was placed in a well-padded posterior short-leg splint + stirrups with the ankle resting in plantar flexion to take pressure off the repair and the posterior skin.  At the conclusion of  the case, the patient was confirmed to have soft compartments and good perfusion distally.     The patient was then extubated and transferred to the postanesthesia care unit in stable condition.     POSTOPERATIVE PLAN OF CARE: The patient will follow the Achilles repair rehab protocol.  Nonweightbearing for 3-4 weeks. We will see him back in 1 weeks for a wound check. Convert to a short leg cast at that time before returning for suture removal at 3 weeks postop. Convert to a cam boot thereafter with use of heel wedges for support.  Begin partial weight-bearing on crutches. Wean crutches and begin full WBAT after 6 weeks and remain in the boot until 10-12 weeks postop.

## 2024-12-26 ENCOUNTER — OFFICE VISIT (OUTPATIENT)
Dept: SPORTS MEDICINE | Facility: CLINIC | Age: 39
End: 2024-12-26
Payer: COMMERCIAL

## 2024-12-26 VITALS
DIASTOLIC BLOOD PRESSURE: 83 MMHG | SYSTOLIC BLOOD PRESSURE: 129 MMHG | BODY MASS INDEX: 25.7 KG/M2 | HEIGHT: 63 IN | HEART RATE: 57 BPM | WEIGHT: 145.06 LBS

## 2024-12-26 DIAGNOSIS — S86.011D RUPTURE OF RIGHT ACHILLES TENDON, SUBSEQUENT ENCOUNTER: Primary | ICD-10-CM

## 2024-12-26 PROCEDURE — 99024 POSTOP FOLLOW-UP VISIT: CPT | Mod: S$GLB,,, | Performed by: ORTHOPAEDIC SURGERY

## 2024-12-26 PROCEDURE — 99999 PR PBB SHADOW E&M-EST. PATIENT-LVL III: CPT | Mod: PBBFAC,,, | Performed by: ORTHOPAEDIC SURGERY

## 2024-12-26 PROCEDURE — 1160F RVW MEDS BY RX/DR IN RCRD: CPT | Mod: CPTII,S$GLB,, | Performed by: ORTHOPAEDIC SURGERY

## 2024-12-26 PROCEDURE — 1159F MED LIST DOCD IN RCRD: CPT | Mod: CPTII,S$GLB,, | Performed by: ORTHOPAEDIC SURGERY

## 2024-12-26 PROCEDURE — 3074F SYST BP LT 130 MM HG: CPT | Mod: CPTII,S$GLB,, | Performed by: ORTHOPAEDIC SURGERY

## 2024-12-26 PROCEDURE — 3079F DIAST BP 80-89 MM HG: CPT | Mod: CPTII,S$GLB,, | Performed by: ORTHOPAEDIC SURGERY

## 2024-12-26 NOTE — PROGRESS NOTES
S:Ferny Torres presents for post-operative evaluation.     DATE OF PROCEDURE: 12/18/2024      PROCEDURES PERFORMED:   Right Achilles open repair (CPT 22066)     SURGEON: LEON Harvey MD     ASSISTANTS:  Pedrito Sidhu MD - Resident  SMA Pancho    Ferny Torres reports to be doing well 1wk s/p the above mentioned procedure. Denies fevers, chills, night sweats, chest pain, difficulty breathing, calf pain or tenderness. Seeing good progress daily. Pain levels are improving. Is taking oxycodone every other day for pain.  He presents in lower leg splint today.  He has been compliant with postoperative instructions.    O: The incisions are healing well.  No signs of infection.  Sutures were kept in place.  No significant pain or unusual tenderness.    A/P:  Lower leg splint was removed today and incision site was examined.  We will transition to a lower leg cast today.  He will continue to be nonweightbearing at this time.  RTC in 2 weeks for suture removal and transition to Cam boot.    POSTOPERATIVE PLAN OF CARE: The patient will follow the Achilles repair rehab protocol.  Nonweightbearing for 3-4 weeks. We will see him back in 1 weeks for a wound check. Convert to a short leg cast at that time before returning for suture removal at 3 weeks postop. Convert to a cam boot thereafter with use of heel wedges for support.  Begin partial weight-bearing on crutches. Wean crutches and begin full WBAT after 6 weeks and remain in the boot until 10-12 weeks postop.

## 2025-01-08 ENCOUNTER — OFFICE VISIT (OUTPATIENT)
Dept: SPORTS MEDICINE | Facility: CLINIC | Age: 40
End: 2025-01-08
Payer: COMMERCIAL

## 2025-01-08 ENCOUNTER — CLINICAL SUPPORT (OUTPATIENT)
Dept: REHABILITATION | Facility: HOSPITAL | Age: 40
End: 2025-01-08
Payer: COMMERCIAL

## 2025-01-08 DIAGNOSIS — S86.011A ACHILLES TENDON TEAR, RIGHT, INITIAL ENCOUNTER: ICD-10-CM

## 2025-01-08 DIAGNOSIS — Z98.890 S/P ACHILLES TENDON REPAIR: Primary | ICD-10-CM

## 2025-01-08 DIAGNOSIS — M25.571 ACUTE RIGHT ANKLE PAIN: Primary | ICD-10-CM

## 2025-01-08 DIAGNOSIS — M62.81 CALF MUSCLE WEAKNESS: ICD-10-CM

## 2025-01-08 DIAGNOSIS — R26.2 DIFFICULTY WALKING: ICD-10-CM

## 2025-01-08 PROCEDURE — 99999 PR PBB SHADOW E&M-EST. PATIENT-LVL III: CPT | Mod: PBBFAC,,, | Performed by: PHYSICIAN ASSISTANT

## 2025-01-08 PROCEDURE — 97110 THERAPEUTIC EXERCISES: CPT | Performed by: PHYSICAL THERAPIST

## 2025-01-08 PROCEDURE — 97161 PT EVAL LOW COMPLEX 20 MIN: CPT | Performed by: PHYSICAL THERAPIST

## 2025-01-08 PROCEDURE — 97140 MANUAL THERAPY 1/> REGIONS: CPT | Performed by: PHYSICAL THERAPIST

## 2025-01-08 NOTE — PROGRESS NOTES
S:Ferny Torres presents for post-operative evaluation.     DATE OF PROCEDURE: 12/18/2024      PROCEDURES PERFORMED:   Right Achilles open repair (CPT 85244)    Ferny Torres reports to be doing well 3wk s/p the above mentioned procedure. Denies fevers, chills, night sweats, chest pain, difficulty breathing, calf pain or tenderness. Seeing good progress daily. No longer taking pain medication. Stats PT today at [place].    O: Hard cast removed. The incision is healing well.  No signs of infection.  Sutures were removed.  No significant pain or unusual tenderness.    A/P:  Doing well. Hard cast removed today. Incision healing well. Ok to shower with incision uncovered. No submerging at this point.  Convert to a cam boot today with use of heel wedges for support.  Begin partial weight-bearing on crutches at this time. Starts post op PT today. Wean crutches and begin full WBAT after 6 weeks and remain in the boot until 10-12 weeks postop.  RTC in 3 weeks for 6 week visit with Dr. Harvey.    POSTOPERATIVE PLAN OF CARE: The patient will follow the Achilles repair rehab protocol.  Nonweightbearing for 3-4 weeks. We will see him back in 1 weeks for a wound check. Convert to a short leg cast at that time before returning for suture removal at 3 weeks postop. Convert to a cam boot thereafter with use of heel wedges for support.  Begin partial weight-bearing on crutches. Wean crutches and begin full WBAT after 6 weeks and remain in the boot until 10-12 weeks postop.

## 2025-01-14 ENCOUNTER — CLINICAL SUPPORT (OUTPATIENT)
Dept: REHABILITATION | Facility: HOSPITAL | Age: 40
End: 2025-01-14
Payer: COMMERCIAL

## 2025-01-14 DIAGNOSIS — M25.571 ACUTE RIGHT ANKLE PAIN: Primary | ICD-10-CM

## 2025-01-14 DIAGNOSIS — M62.81 CALF MUSCLE WEAKNESS: ICD-10-CM

## 2025-01-14 PROBLEM — R26.2 DIFFICULTY WALKING: Status: ACTIVE | Noted: 2025-01-14

## 2025-01-14 PROCEDURE — 97110 THERAPEUTIC EXERCISES: CPT | Performed by: PHYSICAL THERAPIST

## 2025-01-14 PROCEDURE — 97112 NEUROMUSCULAR REEDUCATION: CPT | Performed by: PHYSICAL THERAPIST

## 2025-01-14 PROCEDURE — 97140 MANUAL THERAPY 1/> REGIONS: CPT | Performed by: PHYSICAL THERAPIST

## 2025-01-14 NOTE — PROGRESS NOTES
Physical Therapy Daily Treatment Note     Name: Ferny Torres  Clinic Number: [id number]    Therapy Diagnosis:   Encounter Diagnoses   Name Primary?    Acute right ankle pain Yes    Calf muscle weakness      Physician: Bharat Dickerson*    Visit Date: 1/14/2025  Physician Orders: PT Eval and Treat   Medical Diagnosis from Referral:   S86.011A (ICD-10-CM) - Achilles tendon tear, right, initial encounter         Evaluation Date: 1/8/2025  Authorization Period Expiration: 12/17/2025  Plan of Care Expiration: 6/25/25  Visit # / Visits authorized: 2/ 1  FOTO 1st follow up:  FOTO 2nd follow up:     Time In: 0900  Time Out: 1000  Total Billable Time: 55 minutes     Precautions: Standard,      POSTOPERATIVE PLAN OF CARE: The patient will follow the Achilles repair rehab protocol.  Nonweightbearing for 3-4 weeks. We will see him back in 1 weeks for a wound check. Convert to a short leg cast at that time before returning for suture removal at 3 weeks postop. Convert to a cam boot thereafter with use of heel wedges for support.  Begin partial weight-bearing on crutches. Wean crutches and begin full WBAT after 6 weeks and remain in the boot until 10-12 weeks postop.     Subjective     Pt reports: his ankle and Achilles are feeling great. His shoulders are very sore from the crutches.  He was compliant with home exercise program.  Response to previous treatment: none  Functional change: donning/doffing of boot are better    Pain: 0/10  Location: right ankles     Objective     Ferny received therapeutic exercises to develop strength, endurance, ROM, and flexibility for 30 minutes including:  Seated PF/DF slides;  Seated IV/EV slides;  SLR  Sidelying SLR  Prone hip extension  Seated hip flexion (for GT extension)    Ferny received the following manual therapy techniques: Joint mobilizations were applied to the: R ankle for 10 minutes, including:  ST figure 8  GT mobilization for extension  Mid foot mobilizations    Ferny  participated in neuromuscular re-education activities to improve: Balance, Coordination, Kinesthetic, and Proprioception for 15 minutes. The following activities were included:  Towel scrunches;  Bird dog;  SB Hamstring bridge, legs straight    Ferny participated in dynamic functional therapeutic activities to improve functional performance for   minutes, including:      Home Exercises Provided and Patient Education Provided     Education provided:   - continue with HEP, purchase compression garment for travel    Written Home Exercises Provided: Patient instructed to cont prior HEP.  Exercises were reviewed and Ferny was able to demonstrate them prior to the end of the session.  Ferny demonstrated good  understanding of the education provided.     See EMR under Patient Instructions for exercises provided prior visit.    Assessment   S/p R Achilles repair 12/18/24  Pt presents with good tolerance to exercise and manual therapy. Crutches adjusted to decrease shoulder pain. Educated patient on travel precautions, including use of compression garment. Pt progressing well.     Ferny Is progressing well towards his goals.   Pt prognosis is Excellent.     Pt will continue to benefit from skilled outpatient physical therapy to address the deficits listed in the problem list box on initial evaluation, provide pt/family education and to maximize pt's level of independence in the home and community environment.     Pt's spiritual, cultural and educational needs considered and pt agreeable to plan of care and goals.    Anticipated barriers to physical therapy: none    Goals: Goals:  Short Term Goals: 2-4 weeks  1. Pt will be compliant with HEP 50% of prescribed amount.   2. Decrease worst pain from 4/10 to 1/10.  3. Improve FOTO score by 9 pts (- to - ) to demonstrate improved lower extremity function. - to be given when weight bearing  4. Improve R ankle DF ROM by 5 degrees each week.   5. Pt will be able to perform SLS  for 15 sec on R.      Long Term Goals: 16-20 weeks   Pt will be able to ambulate on even surfaces without R ankle pain.   Pt will be able to ambulate on uneven surfaces without R ankle pain.   Pt will be able to perform 20 B calf raises without pain.   Pt will be able to perform 5 single leg calf raises without pain on R    Plan     Continue with ankle mobility and proximal LE strengthening. Educate on PWB crutch ambulation at next visit.     Jero Raza, PT

## 2025-01-14 NOTE — PLAN OF CARE
[facility]  Physical Therapy Initial Evaluation    Name: Ferny Torres  Clinic Number: [id number]    Therapy Diagnosis:   Encounter Diagnoses   Name Primary?    Achilles tendon tear, right, initial encounter     Acute right ankle pain Yes    Calf muscle weakness     Difficulty walking      Physician: Bharat Dickerson*    Physician Orders: PT Eval and Treat   Medical Diagnosis from Referral:   S86.011A (ICD-10-CM) - Achilles tendon tear, right, initial encounter        Evaluation Date: 1/8/2025  Authorization Period Expiration: 12/17/2025  Plan of Care Expiration: 6/25/25  Visit # / Visits authorized: 1/ 1  FOTO 1st follow up:  FOTO 2nd follow up:    Time In: 0935  Time Out: 1030  Total Billable Time: 55 minutes    Precautions: Standard,     POSTOPERATIVE PLAN OF CARE: The patient will follow the Achilles repair rehab protocol.  Nonweightbearing for 3-4 weeks. We will see him back in 1 weeks for a wound check. Convert to a short leg cast at that time before returning for suture removal at 3 weeks postop. Convert to a cam boot thereafter with use of heel wedges for support.  Begin partial weight-bearing on crutches. Wean crutches and begin full WBAT after 6 weeks and remain in the boot until 10-12 weeks postop.     Subjective   Date of onset: 12/18/24  History of current condition - Ferny reports: R Achilles rupture 2 weeks ago while playing pickleball. Achilles was repaired by Dr. Harvey 2 weeks ago and has been casted since. Cast was removed today without issue. Pt is currently non-weight bearing.        Past Medical History:   Diagnosis Date    Allergy     Concussion 1999    after soccer    Seasonal allergies     Tibial fracture 1999     Ferny Torres  has a past surgical history that includes Mole removal; Mc[place] tooth extraction; and repair, rupture, tendon, achilles (Right, 12/18/2024).    Ferny has a current medication list which includes the following prescription(s): aspirin,  azelastine, clonazepam, hydrocodone-acetaminophen, ibuprofen, magnesium oxide, ondansetron, oxycodone, and propranolol.    Review of patient's allergies indicates:  No Known Allergies     Imaging, none:     Prior Therapy: none for current condition  Social History:  lives with their family  Occupation: non-profit work  Prior Level of Function: pain-free with all ADL's and recreational activities  Current Level of Function: non-weight bearing, limited in all weight bearing ADL's    Pain:  Current 2/10, worst 2/10, best 0/10   Location: right ankles  Description: Aching and Dull  Aggravating Factors: Sitting  Easing Factors: elevation    Pts goals: return to ADL's, running, exercise    Objective     Observation: B crutches with boot     Active Range of Motion:   Ankle Right Left   DF (knee extended) Lacking 10 to neutral 5           Plantarflexion 40 60   Inversion 20 30   Eversion 10 15      Strength:  Ankle Left Right   Dorsiflexion 3-/5 5/5   Plantarflexion Trace 4/5   Inversion 3-/5 4/5   Eversion 3-/5 5/5      Joint Mobility: decreased AP TC glide  Decreased ST medial glide on R     Palpation: TTP at surgical sites     Sensation: intact     Flexibility: limited R calf flexibility     Functional Tests:   SLS EO: NT  SLS EC: NT  Double leg squat: NT  Single leg squat: NT  Single leg calf raise: NT        TREATMENT   Treatment Time In: 0935  Treatment Time Out: 1030  Total Treatment time separate from Evaluation: 25 minutes     Ferny received therapeutic exercises to develop strength, endurance, ROM, and flexibility for 15 minutes including:  Seated PF/DF slides;  Seated IV/EV slides;  Towel scrunches  SLR  Sidelying SLR  Prone hip extension     Ferny received the following manual therapy techniques: Joint mobilizations were applied to the: R ankle for 10 minutes, including:  ST figure 8  GT mobilization for extension     Home Exercises and Patient Education Provided     Education provided re: prognosis, activity  modification, goals for therapy, role of therapy for care, exercises/HEP     Written Home Exercises Provided: yes.  Exercises were reviewed and Ferny  was able to demonstrate them prior to the end of the session.   Pt received a written copy of exercises to perform at home. Ferny demonstrated good understanding of the education provided.      See EMR under patient instructions for exercises given.   Assessment   Ferny is a 39 y.o. male referred to outpatient Physical Therapy with a medical diagnosis of s/p R Achilles repair 12/18/2024. Pt presents with decreased R ankle ROM, impaired joint mobility, LE weakness and pain consistently surgical procedure. Pt will begin weight bearing at week 3-4. Swelling is minimal and joint mobility is appropriate for this stage. Educated patient on ambulating with B crutches, as well as donning/doffing walking boot.      Pt prognosis is Excellent.   Pt will benefit from skilled outpatient Physical Therapy to address the deficits stated above and in the chart below, provide pt/family education, and to maximize pt's level of independence.      Plan of care discussed with patient: Yes  Pt's spiritual, cultural and educational needs considered and patient is agreeable to the plan of care and goals as stated below:      Anticipated Barriers for therapy: none     CMS Impairment/Limitation/Restriction for FOTO ankle Survey     Therapist reviewed FOTO scores on TBD when weight bearing.   FOTO documents entered into [employer] - see Media section.     Limitation Score: -% - next visit           Medical Necessity is demonstrated by the following  History  Co-morbidities and personal factors that may impact the plan of care Co-morbidities:   none     Personal Factors:   no deficits       low   Examination  Body Structures and Functions, activity limitations and participation restrictions that may impact the plan of care Body Regions:   lower extremities     Body Systems:    ROM  strength  balance  gait  transfers  motor control  motor learning     Participation Restrictions:   none     Activity limitations:   Learning and applying knowledge  no deficits     General Tasks and Commands  no deficits     Communication  no deficits     Mobility  walking     Self care  dressing     Domestic Life  no deficits     Interactions/Relationships  no deficits     Life Areas  no deficits     Community and Social Life  no deficits             moderate   Clinical Presentation stable and uncomplicated low   Decision Making/ Complexity Score: low      Goals:  Short Term Goals: 2-4 weeks  1. Pt will be compliant with HEP 50% of prescribed amount.   2. Decrease worst pain from 4/10 to 1/10.  3. Improve FOTO score by 9 pts (- to - ) to demonstrate improved lower extremity function. - to be given when weight bearing  4. Improve R ankle DF ROM by 5 degrees each week.   5. Pt will be able to perform SLS for 15 sec on R.      Long Term Goals: 16-20 weeks   Pt will be able to ambulate on even surfaces without R ankle pain.   Pt will be able to ambulate on uneven surfaces without R ankle pain.   Pt will be able to perform 20 B calf raises without pain.   Pt will be able to perform 5 single leg calf raises without pain on R.      Plan   Plan of care Certification: 1/8/25-6/25/25     Outpatient Physical Therapy 1 times weekly for 20-24 weeks to include the following interventions: Manual Therapy, Neuromuscular Re-ed, Therapeutic Activities, Therapeutic Exercise, and modalities as needed.      Jero Raza, PT, DPT

## 2025-01-17 ENCOUNTER — OFFICE VISIT (OUTPATIENT)
Dept: SPORTS MEDICINE | Facility: CLINIC | Age: 40
End: 2025-01-17
Payer: COMMERCIAL

## 2025-01-17 VITALS
DIASTOLIC BLOOD PRESSURE: 81 MMHG | WEIGHT: 140.88 LBS | HEIGHT: 63 IN | HEART RATE: 92 BPM | SYSTOLIC BLOOD PRESSURE: 126 MMHG | BODY MASS INDEX: 24.96 KG/M2

## 2025-01-17 DIAGNOSIS — Z98.890 S/P ACHILLES TENDON REPAIR: Primary | ICD-10-CM

## 2025-01-17 PROCEDURE — 99999 PR PBB SHADOW E&M-EST. PATIENT-LVL III: CPT | Mod: PBBFAC,,, | Performed by: ORTHOPAEDIC SURGERY

## 2025-01-17 PROCEDURE — 99024 POSTOP FOLLOW-UP VISIT: CPT | Mod: S$GLB,,, | Performed by: ORTHOPAEDIC SURGERY

## 2025-01-17 PROCEDURE — 3074F SYST BP LT 130 MM HG: CPT | Mod: CPTII,S$GLB,, | Performed by: ORTHOPAEDIC SURGERY

## 2025-01-17 PROCEDURE — 3079F DIAST BP 80-89 MM HG: CPT | Mod: CPTII,S$GLB,, | Performed by: ORTHOPAEDIC SURGERY

## 2025-01-17 PROCEDURE — 1159F MED LIST DOCD IN RCRD: CPT | Mod: CPTII,S$GLB,, | Performed by: ORTHOPAEDIC SURGERY

## 2025-01-17 NOTE — PROGRESS NOTES
S:Ferny Torres presents for post-operative evaluation.     DATE OF PROCEDURE: 12/18/2024   PROCEDURES PERFORMED:   Right Achilles open repair (CPT 21852)    Ferny Torres is now 4 weeks 2 days s/p the above mentioned procedure.  The patient contacted me late last night via text reporting a fall onto his operative right lower extremity.  He was in [place] on business travel.  He was doing some stretches and therapy for his right ankle on the bed in his hotel room when he got up and started hopping on his left lower extremity.  He was hopping over to get his walking boot in the room but unfortunately tripped and fell forward.  In reaction, he placed nearly all of his weight over his right operative extremity and felt something weird, perhaps a small pop, over his right Achilles operative site.  He also had a small efflux of serosanguineous fluid from the distal margin of the incision when this occurred.  He then flew home this morning.  I brought him into clinic today for evaluation.  He notes no further drainage over the distal part of the incision.  He does state that he did not feel a significant re-injury to the Achilles like he did the 1st time around but something happened.  It sounds like his right foot was flat on the ground at one point.  He comes into clinic with his wife.  He denies any significant pain.    O:  Exam of the right lower extremity shows actually fairly well-maintained resting plantar flexion of the ankle in the prone knee bent position.  Palpation along the Achilles repair site does not demonstrate any defects.  Perhaps a small irregularity of the medial margin but nothing too significant.  The distal medial margin is tender but again there appears to be some maintain continuity of the repair site.  He also has some toe flicker on Gonzalez test.  The incision is well-healed.  The distal margin is completely dry.  No drainage.  No significant swelling.  No ecchymosis.      A/P: The patient  tripped and fell yesterday placing almost all of his weight over the right lower extremity.  He was to be nonweightbearing per protocol.  Possible Achilles re-tear.  I will say that his clinical exam is a bit more encouraging today although certainly the mechanism is concerning.  I have recommended MRI to assess the repair site.  We we will discuss next steps in treatment depending upon those results.  Otherwise he is to remain in his cam boot with heel lifts and remain nonweightbearing for now.  He will let me know should he have any wound specific issues.  Local wound care provided today in clinic.  He has my cell number.

## 2025-01-18 ENCOUNTER — HOSPITAL ENCOUNTER (OUTPATIENT)
Dept: RADIOLOGY | Facility: HOSPITAL | Age: 40
Discharge: HOME | End: 2025-01-18
Attending: ORTHOPAEDIC SURGERY
Payer: COMMERCIAL

## 2025-01-18 DIAGNOSIS — Z98.890 S/P ACHILLES TENDON REPAIR: ICD-10-CM

## 2025-01-18 PROCEDURE — 73721 MRI JNT OF LWR EXTRE W/O DYE: CPT | Mod: 26,RT,, | Performed by: RADIOLOGY

## 2025-01-18 PROCEDURE — 73721 MRI JNT OF LWR EXTRE W/O DYE: CPT | Mod: TC,RT

## 2025-01-21 ENCOUNTER — TELEPHONE (OUTPATIENT)
Dept: SPORTS MEDICINE | Facility: CLINIC | Age: 40
End: 2025-01-21
Payer: COMMERCIAL

## 2025-01-21 NOTE — TELEPHONE ENCOUNTER
I called the patient yesterday to discuss MRI results.  High-grade partial re-tear of the Achilles demonstrated.  Fortunately, I do not see any significant tissue gapping at the re-tear site. Clinically he has fairly well-maintained resting plantar flexion of the foot in the prone knee bent positioned.  We discussed options as it relates to this re-injury.  This includes continued rehab and conservative treatment of this re-tear.  The other option would be revision surgery for open re-repair and reconstruction with FHL transfer.  I do think revision surgery would be seeing increased risk for complications such as infection, poor wound healing, neurovascular injury, scarring.  In this case I think his best option would be continued rehab and current care.  I think we will delay his overall recovery process a week in this was discussed with his therapist yesterday.  As before we have discussed the need to avoid certain activities for now.  Hold off on weight-bearing another 3 weeks in total.  Continue use of the heel wedges with the cam boot.  Slow wean of the heel wedges in this case to avoid loss of tension.  He is in agreement with the plan.

## 2025-01-23 NOTE — PROGRESS NOTES
S:Ferny Torres presents for post-operative evaluation.     DATE OF PROCEDURE: 12/18/2024   PROCEDURES PERFORMED:   Right Achilles open repair (CPT 46093)    Ferny Torres is now 5 weeks 6 days s/p the above mentioned procedure. He unfortunately re-injured the hindfoot and Achilles recently while out of town in [place].  He fell with full weight over his operative extremity and forced dorsiflexion.  He felt a pop.  MRI was obtained showing a high-grade partial re-tear with approximately 5 mm tissue diastasis.  We have discussed this and elected for continued rehab and conservative treatment of this re-injury.  He is doing well.  Denies any significant re-injury.  He certainly feels that he took a step back from a rehab perspective.  He was seen in PT today.    O:  Exam of the right lower extremity again shows actually fairly well-maintained resting plantar flexion of the ankle in the prone knee bent position.  Palpation along the Achilles repair site does not demonstrate any defects.  Perhaps a small irregularity of the medial margin but nothing too significant.  The distal medial margin is tender but again there appears to be some maintain continuity of the repair site.  He also has some toe flicker on Gonzalez test.  The incision is well-healed.  The distal margin is completely dry.  No further subsequent drainage.  No significant swelling.  There is a bit of small ecchymosis of the lateral hindfoot region.    MRI 01/18/25 of right  ankle  reviewed by me and discussed with patient. Study shows:   1. High-grade partial-thickness retear of the midsubstance Achilles tendon with a 0.5 x 0.6 cm gap.  2. Flexor hallucis longus tenosynovitis.  3. Chronic sprain of the ATFL.  4. 0.7 x 0.3 cm focal area of partial-thickness chondral fissuring with subchondral cystic change at the lateral talar dome.    A/P:  Findings again discussed with the patient.  Overall doing reasonably well.  Continue with the rehab plan as discussed.   We will be a bit slower in weaning his heel wedges given the re-injury mechanism.  Again I do not think that proceeding with any revision repair/reconstruction in this case would add much in terms of his overall functional outcome and would be higher risk for complication in my opinion.  I do expect him to have a full recovery.  We will delay his weight-bearing for 1 more week.  May begin weaning off the crutches thereafter.  Stand boot for full 12 weeks postop.  I will see him back in 6 weeks.  All questions answered.

## 2025-01-28 ENCOUNTER — OFFICE VISIT (OUTPATIENT)
Dept: SPORTS MEDICINE | Facility: CLINIC | Age: 40
End: 2025-01-28
Payer: COMMERCIAL

## 2025-01-28 VITALS
BODY MASS INDEX: 24.98 KG/M2 | HEIGHT: 63 IN | HEART RATE: 65 BPM | DIASTOLIC BLOOD PRESSURE: 65 MMHG | SYSTOLIC BLOOD PRESSURE: 96 MMHG | WEIGHT: 141 LBS

## 2025-01-28 DIAGNOSIS — Z98.890 S/P ACHILLES TENDON REPAIR: Primary | ICD-10-CM

## 2025-01-28 PROCEDURE — 99024 POSTOP FOLLOW-UP VISIT: CPT | Mod: S$GLB,,, | Performed by: ORTHOPAEDIC SURGERY

## 2025-01-28 PROCEDURE — 99999 PR PBB SHADOW E&M-EST. PATIENT-LVL III: CPT | Mod: PBBFAC,,, | Performed by: ORTHOPAEDIC SURGERY

## 2025-01-28 PROCEDURE — 3074F SYST BP LT 130 MM HG: CPT | Mod: CPTII,S$GLB,, | Performed by: ORTHOPAEDIC SURGERY

## 2025-01-28 PROCEDURE — 3078F DIAST BP <80 MM HG: CPT | Mod: CPTII,S$GLB,, | Performed by: ORTHOPAEDIC SURGERY

## 2025-03-16 NOTE — PROGRESS NOTES
CC: Right Achilles post-op follow up     DATE OF PROCEDURE: 12/18/2024   PROCEDURES PERFORMED:   Right Achilles open repair (CPT 54536)    Ferny Torres presents today for follow up appointment of his right Achilles. Patient is now 3 months status post above procedure. Last PT session at [facility] with Jero was 1/14/25.  He states everything is going well.  Denies any significant pain.  He gets some soreness around the calcaneus every now and then with some of his strengthening exercises but this seems to be getting better.    Prior Hx 1/28/2025:   Ferny Torres is now 5 weeks 6 days s/p the above mentioned procedure. He unfortunately re-injured the hindfoot and Achilles recently while out of town in [place].  He fell with full weight over his operative extremity and forced dorsiflexion.  He felt a pop.  MRI was obtained showing a high-grade partial re-tear with approximately 5 mm tissue diastasis.  We have discussed this and elected for continued rehab and conservative treatment of this re-injury.  He is doing well.  Denies any significant re-injury.  He certainly feels that he took a step back from a rehab perspective.  He was seen in PT today.    REVIEW OF SYSTEMS:   Constitution: Negative. Negative for chills, fever and night sweats.    Hematologic/Lymphatic: Negative for bleeding problem. Does not bruise/bleed easily.   Skin: Negative for dry skin, itching and rash.   Musculoskeletal: Negative for falls. Positive for Right lower leg muscle weakness.     All other review of symptoms were reviewed and found to be noncontributory.     PAST MEDICAL HISTORY:   Past Medical History:   Diagnosis Date    Allergy     Concussion 1999    after soccer    Seasonal allergies     Tibial fracture 1999     PAST SURGICAL HISTORY:   Past Surgical History:   Procedure Laterality Date    MOLE REMOVAL      REPAIR, RUPTURE, TENDON, ACHILLES Right 12/18/2024    Procedure: REPAIR, RUPTURE, TENDON, ACHILLES;  Surgeon: LESIA Harvey  "MD Donavan;  Location: [facility] OR;  Service: Orthopedics;  Laterality: Right;  General with Regional Popliteal single shot block    WISDOM TOOTH EXTRACTION       FAMILY HISTORY:   Family History   Problem Relation Name Age of Onset    Skin cancer Mother      Skin cancer Father      Lumbar disc disease Father      Skin cancer Maternal Grandfather Jose Mc     Cancer Maternal Grandfather Jose Mc     Skin cancer Paternal Grandmother Dina Dupré     Cancer Paternal Grandmother Dina Dupré     Heart attack Paternal Grandfather Ferny Cooper, Sr.     Coronary artery disease Paternal Grandfather Ferny Cooper, Sr.     Cancer Paternal Grandfather Ferny Cooper, Sr.     Hearing loss Paternal Grandfather Ferny Cooper, Sr.     Hearing loss Paternal Uncle Peter Cooper     Allergic rhinitis Neg Hx      Allergies Neg Hx      Angioedema Neg Hx      Asthma Neg Hx      Atopy Neg Hx      Eczema Neg Hx      Immunodeficiency Neg Hx      Rhinitis Neg Hx      Urticaria Neg Hx       SOCIAL HISTORY:   Social History[1]    MEDICATIONS:   Current Medications[2]    ALLERGIES:   Review of patient's allergies indicates:  No Known Allergies     PHYSICAL EXAMINATION:  Ht 5' 3" (1.6 m)   Wt 64 kg (141 lb 1.5 oz)   BMI 24.99 kg/m²     General: Well-developed well-nourished 39 y.o. malein no acute distress   Cardiovascular: Regular rhythm by palpation of distal pulse, normal color and temperature, no concerning varicosities on symptomatic side   Lungs: No labored breathing or wheezing appreciated   Neuro: Alert and oriented ×3   Psychiatric: well oriented to person, place and time, demonstrates normal mood and affect   Skin: No rashes, lesions or ulcers, normal temperature, turgor, and texture on involved extremity    Ortho/SPM Exam  Exam of the right lower extremity again shows actually fairly well-maintained resting plantar flexion of the ankle in the prone knee bent position.  Palpation along the Achilles repair site does not demonstrate any " defects.  Really nice healing present.  Excellent continuity.  No tenderness.  Very nice built-in tension without over stretching.  Intact Gonzalez test.  Well-healed incision.  No scar hypertrophy.  Notable calf atrophy.    IMAGING:  Prior MRI 01/18/25 of right  ankle  reviewed by me and discussed with patient. Study shows:   1. High-grade partial-thickness retear of the midsubstance Achilles tendon with a 0.5 x 0.6 cm gap.  2. Flexor hallucis longus tenosynovitis.  3. Chronic sprain of the ATFL.  4. 0.7 x 0.3 cm focal area of partial-thickness chondral fissuring with subchondral cystic change at the lateral talar dome.    ASSESSMENT:      ICD-10-CM ICD-9-CM   1. Right calf atrophy  M62.561 728.2       PLAN:     Ferny is making excellent progress.  Again his overall recovery has been delayed a bit by his re-injury.  We will need to protect this just a bit longer.  Likely 2 more weeks in the boot given the re-injury mechanism but then we will transition him to normal shoe wear with heel lifts thereafter.  At the discretion of his physical therapist.  Continue rehab exercises.  Work on building calf and functional strength.  He does wish to go on a trip to the [employer] in April.  I did discussed with him my concerns as it relates to the logistics of that trip.  I certainly do not want to see him have his hindfoot or ankle become inflamed or aggravated during that trip.  Could compromise his overall progress through recovery.  I do think there is a way to get that done safely however given the uniqueness of the trip opportunity.  He will have some additional counseling from his physical therapist in regards to that.  I will see the patient back in 2 months for recheck.      Procedures          [1]   Social History  Socioeconomic History    Marital status:    Occupational History    Occupation:     Tobacco Use    Smoking status: Never    Smokeless tobacco: Never   Substance and Sexual Activity     Alcohol use: Yes     Alcohol/week: 3.0 standard drinks of alcohol     Types: 2 Glasses of wine, 1 Cans of beer per week     Comment: Have a glass of wine with dinner a couple of nights a week    Drug use: No    Sexual activity: Yes     Partners: Female     Birth control/protection: None     Social Drivers of Health     Financial Resource Strain: Low Risk  (12/23/2024)    Overall Financial Resource Strain (CARDIA)     Difficulty of Paying Living Expenses: Not hard at all   Food Insecurity: No Food Insecurity (12/23/2024)    Hunger Vital Sign     Worried About Running Out of Food in the Last Year: Never true     Ran Out of Food in the Last Year: Never true   Transportation Needs: No Transportation Needs (9/30/2019)    PRAPARE - Transportation     Lack of Transportation (Medical): No     Lack of Transportation (Non-Medical): No   Physical Activity: Sufficiently Active (12/23/2024)    Exercise Vital Sign     Days of Exercise per Week: 3 days     Minutes of Exercise per Session: 60 min   Stress: No Stress Concern Present (12/23/2024)    [employer] - Occupational Stress Questionnaire     Feeling of Stress : Not at all   Housing Stability: Unknown (12/23/2024)    Housing Stability Vital Sign     Unable to Pay for Housing in the Last Year: No   [2]   Current Outpatient Medications:     aspirin (ECOTRIN) 81 MG EC tablet, Take 1 tablet (81 mg total) by mouth 2 (two) times a day. for 14 days, Disp: 28 tablet, Rfl: 0    azelastine (ASTELIN) 137 mcg (0.1 %) nasal spray, 2 sprays (274 mcg total) by Nasal route 2 (two) times daily as needed for Rhinitis., Disp: 30 mL, Rfl: 12    clonazePAM (KLONOPIN) 0.5 MG tablet, Take 1 tablet (0.5 mg total) by mouth daily as needed for Anxiety., Disp: 30 tablet, Rfl: 0    HYDROcodone-acetaminophen (NORCO) 5-325 mg per tablet, Take 1 tablet by mouth every 6 (six) hours as needed for Pain., Disp: 8 tablet, Rfl: 0    ibuprofen (ADVIL,MOTRIN) 800 MG tablet, Take 800  mg by mouth 3 (three) times daily., Disp: , Rfl:     magnesium oxide (MAG-OX) 400 mg tablet, Take 1 tablet (400 mg total) by mouth every evening., Disp: 30 tablet, Rfl: 3    ondansetron (ZOFRAN-ODT) 4 MG TbDL, Dissovle 1 tablet (4 mg total) by mouth every 8 (eight) hours as needed (nausea)., Disp: 30 tablet, Rfl: 0    oxyCODONE (ROXICODONE) 5 MG immediate release tablet, Take 1-2 tablets (5-10 mg total) by mouth every 4 to 6 hours as needed for Pain., Disp: 28 tablet, Rfl: 0    propranolol (INDERAL) 20 MG tablet, Take 1 tablet (20 mg total) by mouth daily as needed. (Patient not taking: Reported on 10/1/2019), Disp: 30 tablet, Rfl: 1

## 2025-03-18 ENCOUNTER — OFFICE VISIT (OUTPATIENT)
Dept: SPORTS MEDICINE | Facility: CLINIC | Age: 40
End: 2025-03-18
Payer: COMMERCIAL

## 2025-03-18 VITALS — HEIGHT: 63 IN | BODY MASS INDEX: 25.01 KG/M2 | WEIGHT: 141.13 LBS

## 2025-03-18 DIAGNOSIS — M62.561 RIGHT CALF ATROPHY: Primary | ICD-10-CM

## 2025-03-18 PROCEDURE — 99999 PR PBB SHADOW E&M-EST. PATIENT-LVL III: CPT | Mod: PBBFAC,,, | Performed by: ORTHOPAEDIC SURGERY

## 2025-05-07 NOTE — PROGRESS NOTES
CC: Right Achilles post-op follow up     DATE OF PROCEDURE: 12/18/2024   PROCEDURES PERFORMED:   Right Achilles open repair (CPT 21750)    Ferny Torres presents today for follow up appointment of his right Achilles. Patient is now 5 months status post above procedure. Has been doing PT and reports some continued subjective calf weakness.  Doing well overall.  No pain.    Prior Hx 3/18/2025:   Ferny Torres presents today for follow up appointment of his right Achilles. Patient is now 3 months status post above procedure. Last PT session at [facility] with Jero was 1/14/25.  He states everything is going well.  Denies any significant pain.  He gets some soreness around the calcaneus every now and then with some of his strengthening exercises but this seems to be getting better.    Prior Hx 1/28/2025:   Ferny Torres is now 5 weeks 6 days s/p the above mentioned procedure. He unfortunately re-injured the hindfoot and Achilles recently while out of town in [place].  He fell with full weight over his operative extremity and forced dorsiflexion.  He felt a pop.  MRI was obtained showing a high-grade partial re-tear with approximately 5 mm tissue diastasis.  We have discussed this and elected for continued rehab and conservative treatment of this re-injury.  He is doing well.  Denies any significant re-injury.  He certainly feels that he took a step back from a rehab perspective.  He was seen in PT today.    REVIEW OF SYSTEMS:   Constitution: Negative. Negative for chills, fever and night sweats.    Hematologic/Lymphatic: Negative for bleeding problem. Does not bruise/bleed easily.   Skin: Negative for dry skin, itching and rash.   Musculoskeletal: Negative for falls. Positive for Right lower leg muscle weakness.     All other review of symptoms were reviewed and found to be noncontributory.     PAST MEDICAL HISTORY:   Past Medical History:   Diagnosis Date    Allergy     Concussion 1999    after soccer    Seasonal  "allergies     Tibial fracture 1999     PAST SURGICAL HISTORY:   Past Surgical History:   Procedure Laterality Date    MOLE REMOVAL      REPAIR, RUPTURE, TENDON, ACHILLES Right 12/18/2024    Procedure: REPAIR, RUPTURE, TENDON, ACHILLES;  Surgeon: LESIA Harvey MD;  Location: [facility];  Service: Orthopedics;  Laterality: Right;  General with Regional Popliteal single shot block    WISDOM TOOTH EXTRACTION       FAMILY HISTORY:   Family History   Problem Relation Name Age of Onset    Skin cancer Mother      Skin cancer Father      Lumbar disc disease Father      Skin cancer Maternal Grandfather Jose Mc     Cancer Maternal Grandfather Jose Mc     Skin cancer Paternal Grandmother Dina Dupré     Cancer Paternal Grandmother Dina Dupré     Heart attack Paternal Grandfather Ferny Cooper, Sr.     Coronary artery disease Paternal Grandfather Ferny Cooper, Sr.     Cancer Paternal Grandfather Ferny Cooper, Sr.     Hearing loss Paternal Grandfather Ferny Cooper, Sr.     Hearing loss Paternal Uncle Peter Cooper     Allergic rhinitis Neg Hx      Allergies Neg Hx      Angioedema Neg Hx      Asthma Neg Hx      Atopy Neg Hx      Eczema Neg Hx      Immunodeficiency Neg Hx      Rhinitis Neg Hx      Urticaria Neg Hx       SOCIAL HISTORY:   Social History[1]    MEDICATIONS:   Current Medications[2]    ALLERGIES:   Review of patient's allergies indicates:  No Known Allergies     PHYSICAL EXAMINATION:  BP (!) 166/66   Pulse 67   Ht 5' 3" (1.6 m)   Wt 64.6 kg (142 lb 6 oz)   BMI 25.22 kg/m²     General: Well-developed well-nourished 39 y.o. malein no acute distress   Cardiovascular: Regular rhythm by palpation of distal pulse, normal color and temperature, no concerning varicosities on symptomatic side   Lungs: No labored breathing or wheezing appreciated   Neuro: Alert and oriented ×3   Psychiatric: well oriented to person, place and time, demonstrates normal mood and affect   Skin: No rashes, lesions or ulcers, normal " temperature, turgor, and texture on involved extremity    Ortho/SPM Exam  Exam of the right lower extremity again shows actually fairly well-maintained resting plantar flexion of the ankle in the prone knee bent position.  Palpation along the Achilles repair site does not demonstrate any defects.  Really nice healing present.  Excellent continuity.  No tenderness.  Very nice built-in tension without over stretching.  Intact Gonzalez test.  Well-healed incision.  No scar hypertrophy.  Notable calf atrophy.  Getting stronger.  Able to do double heel rise.  Able to do a partial single heel rise.    IMAGING:  Prior MRI 01/18/25 of right  ankle  reviewed by me and discussed with patient. Study shows:   1. High-grade partial-thickness retear of the midsubstance Achilles tendon with a 0.5 x 0.6 cm gap.  2. Flexor hallucis longus tenosynovitis.  3. Chronic sprain of the ATFL.  4. 0.7 x 0.3 cm focal area of partial-thickness chondral fissuring with subchondral cystic change at the lateral talar dome.    ASSESSMENT:      ICD-10-CM ICD-9-CM   1. Right calf atrophy  M62.561 728.2   2. S/P Achilles tendon repair  Z98.890 V45.89     PLAN:     Clinically making progress.  Continue to work on regaining calf and functional strength.  Likely not ready from a rehab perspective for running progression but I will defer that to his therapist.  Continue physical therapy.  I will see the patient back in 2 months.  Discussed activities to avoid.  I expect a full recovery but he understands that this will take some time.      Procedures          [1]   Social History  Socioeconomic History    Marital status:    Occupational History    Occupation:     Tobacco Use    Smoking status: Never    Smokeless tobacco: Never   Substance and Sexual Activity    Alcohol use: Yes     Alcohol/week: 3.0 standard drinks of alcohol     Types: 2 Glasses of wine, 1 Cans of beer per week     Comment: Have a glass of wine with dinner a  couple of nights a week    Drug use: No    Sexual activity: Yes     Partners: Female     Birth control/protection: None     Social Drivers of Health     Financial Resource Strain: Low Risk  (12/23/2024)    Overall Financial Resource Strain (CARDIA)     Difficulty of Paying Living Expenses: Not hard at all   Food Insecurity: No Food Insecurity (12/23/2024)    Hunger Vital Sign     Worried About Running Out of Food in the Last Year: Never true     Ran Out of Food in the Last Year: Never true   Transportation Needs: No Transportation Needs (9/30/2019)    PRAPARE - Transportation     Lack of Transportation (Medical): No     Lack of Transportation (Non-Medical): No   Physical Activity: Sufficiently Active (12/23/2024)    Exercise Vital Sign     Days of Exercise per Week: 3 days     Minutes of Exercise per Session: 60 min   Stress: No Stress Concern Present (12/23/2024)    [employer] - Occupational Stress Questionnaire     Feeling of Stress : Not at all   Housing Stability: Unknown (12/23/2024)    Housing Stability Vital Sign     Unable to Pay for Housing in the Last Year: No   [2]   Current Outpatient Medications:     aspirin (ECOTRIN) 81 MG EC tablet, Take 1 tablet (81 mg total) by mouth 2 (two) times a day. for 14 days, Disp: 28 tablet, Rfl: 0    azelastine (ASTELIN) 137 mcg (0.1 %) nasal spray, 2 sprays (274 mcg total) by Nasal route 2 (two) times daily as needed for Rhinitis., Disp: 30 mL, Rfl: 12    clonazePAM (KLONOPIN) 0.5 MG tablet, Take 1 tablet (0.5 mg total) by mouth daily as needed for Anxiety., Disp: 30 tablet, Rfl: 0    HYDROcodone-acetaminophen (NORCO) 5-325 mg per tablet, Take 1 tablet by mouth every 6 (six) hours as needed for Pain., Disp: 8 tablet, Rfl: 0    ibuprofen (ADVIL,MOTRIN) 800 MG tablet, Take 800 mg by mouth 3 (three) times daily. (Patient not taking: Reported on 5/20/2025), Disp: , Rfl:     magnesium oxide (MAG-OX) 400 mg tablet, Take 1 tablet (400 mg total) by  mouth every evening., Disp: 30 tablet, Rfl: 3    ondansetron (ZOFRAN-ODT) 4 MG TbDL, Dissovle 1 tablet (4 mg total) by mouth every 8 (eight) hours as needed (nausea). (Patient not taking: Reported on 5/20/2025), Disp: 30 tablet, Rfl: 0    oxyCODONE (ROXICODONE) 5 MG immediate release tablet, Take 1-2 tablets (5-10 mg total) by mouth every 4 to 6 hours as needed for Pain. (Patient not taking: Reported on 5/20/2025), Disp: 28 tablet, Rfl: 0    propranolol (INDERAL) 20 MG tablet, Take 1 tablet (20 mg total) by mouth daily as needed. (Patient not taking: Reported on 10/1/2019), Disp: 30 tablet, Rfl: 1

## 2025-05-20 ENCOUNTER — OFFICE VISIT (OUTPATIENT)
Dept: SPORTS MEDICINE | Facility: CLINIC | Age: 40
End: 2025-05-20
Payer: COMMERCIAL

## 2025-05-20 VITALS
HEIGHT: 63 IN | DIASTOLIC BLOOD PRESSURE: 66 MMHG | SYSTOLIC BLOOD PRESSURE: 166 MMHG | BODY MASS INDEX: 25.23 KG/M2 | HEART RATE: 67 BPM | WEIGHT: 142.38 LBS

## 2025-05-20 DIAGNOSIS — M62.561 RIGHT CALF ATROPHY: Primary | ICD-10-CM

## 2025-05-20 DIAGNOSIS — Z98.890 S/P ACHILLES TENDON REPAIR: ICD-10-CM

## 2025-05-20 PROCEDURE — 3008F BODY MASS INDEX DOCD: CPT | Mod: CPTII,S$GLB,, | Performed by: ORTHOPAEDIC SURGERY

## 2025-05-20 PROCEDURE — 99999 PR PBB SHADOW E&M-EST. PATIENT-LVL III: CPT | Mod: PBBFAC,,, | Performed by: ORTHOPAEDIC SURGERY

## 2025-05-20 PROCEDURE — 99213 OFFICE O/P EST LOW 20 MIN: CPT | Mod: S$GLB,,, | Performed by: ORTHOPAEDIC SURGERY

## 2025-05-20 PROCEDURE — 1159F MED LIST DOCD IN RCRD: CPT | Mod: CPTII,S$GLB,, | Performed by: ORTHOPAEDIC SURGERY

## 2025-05-20 PROCEDURE — 3077F SYST BP >= 140 MM HG: CPT | Mod: CPTII,S$GLB,, | Performed by: ORTHOPAEDIC SURGERY

## 2025-05-20 PROCEDURE — 3078F DIAST BP <80 MM HG: CPT | Mod: CPTII,S$GLB,, | Performed by: ORTHOPAEDIC SURGERY

## 2025-08-02 NOTE — PROGRESS NOTES
CC: Right Achilles post-op follow up     DATE OF PROCEDURE: 12/18/2024   PROCEDURES PERFORMED:   Right Achilles open repair (CPT 61409)    Ferny Torres presents today for follow up appointment of his right Achilles. Patient is now 7.5 months status post above procedure.  States he is doing well overall.  He still has discernible subjective weakness in the calf with atrophy but able to do more.  He recently was able to go on a hiking trip with his family and participate.  Has not returned to running quite yet.  He does have a goal of returning to some endurance running and triathlete type activity.  He denies any pain.    Prior Hx 5/20/2025:  Ferny Torres presents today for follow up appointment of his right Achilles. Patient is now 5 months status post above procedure. Has been doing PT and reports some continued subjective calf weakness.  Doing well overall.  No pain.    Prior Hx 3/18/2025:   Ferny Torres presents today for follow up appointment of his right Achilles. Patient is now 3 months status post above procedure. Last PT session at [facility] with Jero was 1/14/25.  He states everything is going well.  Denies any significant pain.  He gets some soreness around the calcaneus every now and then with some of his strengthening exercises but this seems to be getting better.    Prior Hx 1/28/2025:   Ferny Torres is now 5 weeks 6 days s/p the above mentioned procedure. He unfortunately re-injured the hindfoot and Achilles recently while out of town in [place].  He fell with full weight over his operative extremity and forced dorsiflexion.  He felt a pop.  MRI was obtained showing a high-grade partial re-tear with approximately 5 mm tissue diastasis.  We have discussed this and elected for continued rehab and conservative treatment of this re-injury.  He is doing well.  Denies any significant re-injury.  He certainly feels that he took a step back from a rehab perspective.  He was seen in PT today.    REVIEW OF  SYSTEMS:   Constitution: Negative. Negative for chills, fever and night sweats.    Hematologic/Lymphatic: Negative for bleeding problem. Does not bruise/bleed easily.   Skin: Negative for dry skin, itching and rash.   Musculoskeletal: Negative for falls. Positive for Right lower leg muscle weakness.     All other review of symptoms were reviewed and found to be noncontributory.     PAST MEDICAL HISTORY:   Past Medical History:   Diagnosis Date    Allergy     Concussion 1999    after soccer    Seasonal allergies     Tibial fracture 1999     PAST SURGICAL HISTORY:   Past Surgical History:   Procedure Laterality Date    MOLE REMOVAL      REPAIR, RUPTURE, TENDON, ACHILLES Right 12/18/2024    Procedure: REPAIR, RUPTURE, TENDON, ACHILLES;  Surgeon: LESIA Harvey MD;  Location: [facility];  Service: Orthopedics;  Laterality: Right;  General with Regional Popliteal single shot block    WISDOM TOOTH EXTRACTION       FAMILY HISTORY:   Family History   Problem Relation Name Age of Onset    Skin cancer Mother      Skin cancer Father      Lumbar disc disease Father      Skin cancer Maternal Grandfather Jose Sauly     Cancer Maternal Grandfather Jose Mc     Skin cancer Paternal Grandmother Dina Dupré     Cancer Paternal Grandmother Dina Dupré     Heart attack Paternal Grandfather Ferny Cooper, Sr.     Coronary artery disease Paternal Grandfather Fernymana Cooper, Sr.     Cancer Paternal Grandfather Ferny Cooper, Sr.     Hearing loss Paternal Grandfather Ferny Cooper, Sr.     Hearing loss Paternal Uncle Peter Cooper     Allergic rhinitis Neg Hx      Allergies Neg Hx      Angioedema Neg Hx      Asthma Neg Hx      Atopy Neg Hx      Eczema Neg Hx      Immunodeficiency Neg Hx      Rhinitis Neg Hx      Urticaria Neg Hx       SOCIAL HISTORY:   Social History[1]    MEDICATIONS:   Current Medications[2]    ALLERGIES:   Review of patient's allergies indicates:  No Known Allergies     PHYSICAL EXAMINATION:  BP (!) 134/91   Pulse 83    Wt 63.5 kg (140 lb)   BMI 24.80 kg/m²     General: Well-developed well-nourished 40 y.o. malein no acute distress   Cardiovascular: Regular rhythm by palpation of distal pulse, normal color and temperature, no concerning varicosities on symptomatic side   Lungs: No labored breathing or wheezing appreciated   Neuro: Alert and oriented ×3   Psychiatric: well oriented to person, place and time, demonstrates normal mood and affect   Skin: No rashes, lesions or ulcers, normal temperature, turgor, and texture on involved extremity    Ortho/SPM Exam  Exam of the right lower extremity again shows actually fairly well-maintained resting plantar flexion of the ankle in the prone knee bent position.  Palpation along the Achilles repair site does not demonstrate any defects.  Really nice healing present.  Tendon hypertrophy at the repair site which is good.  Excellent continuity.  No tenderness.  Very nice built-in tension without over stretching.  Intact Gonzalez test.  Well-healed incision.  No scar hypertrophy.  Notable calf atrophy compared to the contralateral side but getting stronger. Able to do double heel rises in repetition but struggles with full single heel rise still.  No single leg hop.    IMAGING:  Prior MRI 01/18/25 of right  ankle  reviewed by me and discussed with patient. Study shows:   1. High-grade partial-thickness retear of the midsubstance Achilles tendon with a 0.5 x 0.6 cm gap.  2. Flexor hallucis longus tenosynovitis.  3. Chronic sprain of the ATFL.  4. 0.7 x 0.3 cm focal area of partial-thickness chondral fissuring with subchondral cystic change at the lateral talar dome.    ASSESSMENT:      ICD-10-CM ICD-9-CM   1. Right leg weakness  R29.898 729.89   2. Right calf atrophy  M62.561 728.2       PLAN:     Ferny continues to make progress in his rehab.  Has continued calf atrophy and weakness which certainly can occur.  Needs additional therapy for strengthening and functional return to activity  exercises.  I will defer straight line running progression to land to his physical therapist.  He needs to be able to reps single heel rises and do some single leg hops were for re return to high impact activity and change in direction.  He is in agreement.  I think it is going to be at least another 6 weeks before he can do some land-based running.  Likely another 3 months before he is extending out his distance on the land-based running.  -RTC in 2-3 months for recheck    Procedures            [1]   Social History  Socioeconomic History    Marital status:    Occupational History    Occupation:     Tobacco Use    Smoking status: Never    Smokeless tobacco: Never   Substance and Sexual Activity    Alcohol use: Yes     Alcohol/week: 3.0 standard drinks of alcohol     Types: 2 Glasses of wine, 1 Cans of beer per week     Comment: Have a glass of wine with dinner a couple of nights a week    Drug use: No    Sexual activity: Yes     Partners: Female     Birth control/protection: None     Social Drivers of Health     Financial Resource Strain: Low Risk  (12/23/2024)    Overall Financial Resource Strain (CARDIA)     Difficulty of Paying Living Expenses: Not hard at all   Food Insecurity: No Food Insecurity (12/23/2024)    Hunger Vital Sign     Worried About Running Out of Food in the Last Year: Never true     Ran Out of Food in the Last Year: Never true   Transportation Needs: No Transportation Needs (9/30/2019)    PRAPARE - Transportation     Lack of Transportation (Medical): No     Lack of Transportation (Non-Medical): No   Physical Activity: Sufficiently Active (12/23/2024)    Exercise Vital Sign     Days of Exercise per Week: 3 days     Minutes of Exercise per Session: 60 min   Stress: No Stress Concern Present (12/23/2024)    [place] [employer] - Occupational Stress Questionnaire     Feeling of Stress : Not at all   Housing Stability: Unknown (12/23/2024)    Housing  Stability Vital Sign     Unable to Pay for Housing in the Last Year: No   [2]   Current Outpatient Medications:     aspirin (ECOTRIN) 81 MG EC tablet, Take 1 tablet (81 mg total) by mouth 2 (two) times a day. for 14 days, Disp: 28 tablet, Rfl: 0    azelastine (ASTELIN) 137 mcg (0.1 %) nasal spray, 2 sprays (274 mcg total) by Nasal route 2 (two) times daily as needed for Rhinitis., Disp: 30 mL, Rfl: 12    clonazePAM (KLONOPIN) 0.5 MG tablet, Take 1 tablet (0.5 mg total) by mouth daily as needed for Anxiety., Disp: 30 tablet, Rfl: 0    HYDROcodone-acetaminophen (NORCO) 5-325 mg per tablet, Take 1 tablet by mouth every 6 (six) hours as needed for Pain., Disp: 8 tablet, Rfl: 0    ibuprofen (ADVIL,MOTRIN) 800 MG tablet, Take 800 mg by mouth 3 (three) times daily. (Patient not taking: Reported on 8/5/2025), Disp: , Rfl:     magnesium oxide (MAG-OX) 400 mg tablet, Take 1 tablet (400 mg total) by mouth every evening., Disp: 30 tablet, Rfl: 3    ondansetron (ZOFRAN-ODT) 4 MG TbDL, Dissovle 1 tablet (4 mg total) by mouth every 8 (eight) hours as needed (nausea). (Patient not taking: Reported on 8/5/2025), Disp: 30 tablet, Rfl: 0    oxyCODONE (ROXICODONE) 5 MG immediate release tablet, Take 1-2 tablets (5-10 mg total) by mouth every 4 to 6 hours as needed for Pain. (Patient not taking: Reported on 8/5/2025), Disp: 28 tablet, Rfl: 0    propranolol (INDERAL) 20 MG tablet, Take 1 tablet (20 mg total) by mouth daily as needed. (Patient not taking: Reported on 10/1/2019), Disp: 30 tablet, Rfl: 1

## 2025-08-05 ENCOUNTER — OFFICE VISIT (OUTPATIENT)
Dept: SPORTS MEDICINE | Facility: CLINIC | Age: 40
End: 2025-08-05
Payer: COMMERCIAL

## 2025-08-05 VITALS
SYSTOLIC BLOOD PRESSURE: 134 MMHG | HEART RATE: 83 BPM | WEIGHT: 140 LBS | BODY MASS INDEX: 24.8 KG/M2 | DIASTOLIC BLOOD PRESSURE: 91 MMHG

## 2025-08-05 DIAGNOSIS — R29.898 RIGHT LEG WEAKNESS: Primary | ICD-10-CM

## 2025-08-05 DIAGNOSIS — M62.561 RIGHT CALF ATROPHY: ICD-10-CM

## 2025-08-05 PROCEDURE — 3080F DIAST BP >= 90 MM HG: CPT | Mod: CPTII,S$GLB,, | Performed by: ORTHOPAEDIC SURGERY

## 2025-08-05 PROCEDURE — 3008F BODY MASS INDEX DOCD: CPT | Mod: CPTII,S$GLB,, | Performed by: ORTHOPAEDIC SURGERY

## 2025-08-05 PROCEDURE — 99213 OFFICE O/P EST LOW 20 MIN: CPT | Mod: S$GLB,,, | Performed by: ORTHOPAEDIC SURGERY

## 2025-08-05 PROCEDURE — 3075F SYST BP GE 130 - 139MM HG: CPT | Mod: CPTII,S$GLB,, | Performed by: ORTHOPAEDIC SURGERY

## 2025-08-05 PROCEDURE — 1159F MED LIST DOCD IN RCRD: CPT | Mod: CPTII,S$GLB,, | Performed by: ORTHOPAEDIC SURGERY

## 2025-08-05 PROCEDURE — 99999 PR PBB SHADOW E&M-EST. PATIENT-LVL III: CPT | Mod: PBBFAC,,, | Performed by: ORTHOPAEDIC SURGERY

## (undated) DEVICE — ELECTRODE REM PLYHSV RETURN 9

## (undated) DEVICE — BANDAGE ESMARK ELASTIC ST 4X9

## (undated) DEVICE — UNDERGLOVES BIOGEL PI SIZE 8

## (undated) DEVICE — DRAPE U SPLIT SHEET 54X76IN

## (undated) DEVICE — SPONGE COTTON TRAY 4X4IN

## (undated) DEVICE — GLOVE BIOGEL PI MICRO SZ 7

## (undated) DEVICE — DRAPE INCISE IOBAN 2 23X17IN

## (undated) DEVICE — BLADE SURG #15 CARBON STEEL

## (undated) DEVICE — GLOVE BIOGEL PI MICRO INDIC 7

## (undated) DEVICE — COVER LIGHT HANDLE 80/CA

## (undated) DEVICE — SUT ETHILON 3-0 PS2 18 BLK

## (undated) DEVICE — UNDERGLOVES BIOGEL PI SIZE 8.5

## (undated) DEVICE — DRESSING XEROFORM NONADH 1X8IN

## (undated) DEVICE — GOWN ECLIPSE REINF LV4 XLNG XL

## (undated) DEVICE — BNDG COFLEX FOAM LF2 ST 4X5YD

## (undated) DEVICE — DRAPE TOP 53X102IN

## (undated) DEVICE — PAD CAST SPECIALIST STRL 6

## (undated) DEVICE — BANDAGE MATRIX HK LOOP 4IN 5YD

## (undated) DEVICE — YANKAUER OPEN TIP W/O VENT

## (undated) DEVICE — TOURNIQUET SB QC DP 34X4IN

## (undated) DEVICE — PAD CAST SPECIALIST STRL 4

## (undated) DEVICE — DRAPE ORTH SPLIT 77X108IN

## (undated) DEVICE — DRAPE T EXTRM SURG 121X128X90

## (undated) DEVICE — GLOVE BIOGEL SKINSENSE PI 8.0

## (undated) DEVICE — SUT 0 VICRYL / CT-1

## (undated) DEVICE — BUCKET PLASTER DISPOSABLE

## (undated) DEVICE — TRAY MINOR ORTHO OMC

## (undated) DEVICE — SUT TAPE 2.5 CIRCLE 36.6X1.3MM

## (undated) DEVICE — SUT TAPE .5 CIRCLE 36.6X1.3MM

## (undated) DEVICE — DRAPE THREE-QTR REINF 53X77IN

## (undated) DEVICE — SOCKINETTE IMPERVIOUS 12X48IN

## (undated) DEVICE — COVER CAMERA OPERATING ROOM

## (undated) DEVICE — SUT 2-0 VICRYL / SH (J417)

## (undated) DEVICE — DRAPE STERI U-SHAPED 47X51IN

## (undated) DEVICE — APPLICATOR CHLORAPREP ORN 26ML

## (undated) DEVICE — PAD DERMAPROX THCK 11X15X1IN

## (undated) DEVICE — BLADE TONGUE DEPRESSOR STRL

## (undated) DEVICE — BANDAGE MATRIX HK LOOP 6IN 5YD

## (undated) DEVICE — SUT VICRYL 3-0 27 SH

## (undated) DEVICE — SPLINT PLASTER FAST SET 5X30IN

## (undated) DEVICE — GLOVE BIOGEL SKINSENSE PI 7.0

## (undated) DEVICE — SOL NACL IRR 1000ML BTL

## (undated) DEVICE — TOWEL OR DISP STRL BLUE 4/PK

## (undated) DEVICE — SUT PROLENE 3-0 PS-1 BL 18

## (undated) DEVICE — PILLOW HEAD REST